# Patient Record
Sex: FEMALE
[De-identification: names, ages, dates, MRNs, and addresses within clinical notes are randomized per-mention and may not be internally consistent; named-entity substitution may affect disease eponyms.]

---

## 2022-01-05 ENCOUNTER — NURSE TRIAGE (OUTPATIENT)
Dept: OTHER | Facility: CLINIC | Age: 67
End: 2022-01-05

## 2022-01-05 NOTE — TELEPHONE ENCOUNTER
Reason for Disposition   General information question, no triage required and triager able to answer question    Protocols used: INFORMATION ONLY CALL - NO TRIAGE-ADULT-[facility]    ERROR

## 2022-01-05 NOTE — TELEPHONE ENCOUNTER
Subjective: Caller states \"I just want to information on low blood sugar. \"     Current Symptoms:None/NA      Associated Symptoms: NA    Pain Severity: 0/10; N/A; none    Temperature: N/A N/A    What has been tried: PT calling for info only. Recommended disposition: Home care. Care advice provided, patient verbalizes understanding; denies any other questions or concerns; instructed to call back for any new or worsening symptoms. PT called in for advice and care advice given. This triage is a result of a call to [address] [place] [address]. Please do not respond to the triage nurse through this encounter. Any subsequent communication should be directly with the patient.       Reason for Disposition   Low blood sugar prevention, questions about    Protocols used: DIABETES - LOW BLOOD SUGAR-ADULT-

## 2022-01-27 NOTE — ADDENDUM NOTE
Addended by: Sherwin Agarwal on: 1/27/2022 12:35 PM     Modules accepted: Level of Service, SmartSet

## 2022-07-24 ENCOUNTER — NURSE TRIAGE (OUTPATIENT)
Dept: OTHER | Facility: CLINIC | Age: 67
End: 2022-07-24

## 2022-07-24 NOTE — TELEPHONE ENCOUNTER
Subjective: Caller states \"I am concerned about Covid\"     Current Symptoms: Caller is concerned about Covid and going to wedding reception with her risk factors. Caller just wants to talk through going to the wedding or not. Onset: NA    Associated Symptoms: NA    Pain Severity: NA    Temperature: NA    What has been tried: NA    LMP: NA Pregnant: NA    Recommended disposition: NA    Care advice provided, patient verbalizes understanding; denies any other questions or concerns; instructed to call back for any new or worsening symptoms. Caller wants to talk through going to nephews wedding    This triage is a result of a call to [facility]. Please do not respond to the triage nurse through this encounter. Any subsequent communication should be directly with the patient. Reason for Disposition   General information question, no triage required and triager able to answer question    Protocols used:  Information Only [facility] - [facility]

## 2022-07-26 ENCOUNTER — NURSE TRIAGE (OUTPATIENT)
Dept: OTHER | Facility: CLINIC | Age: 67
End: 2022-07-26

## 2022-07-26 NOTE — TELEPHONE ENCOUNTER
Subjective: Caller states \"Concerned about Covid \"     Current Symptoms: Has a wedding coming up and concerned about being exposed to Covid. Onset: n/a    Associated Symptoms: NA    Pain Severity: n/a    Temperature: n/a    What has been tried: n/a    LMP: NA Pregnant: NA    Recommended disposition:  No triage, info only    Care advice provided, patient verbalizes understanding; denies any other questions or concerns; instructed to call back for any new or worsening symptoms. Just wanted to discuss traveling with Covid precaution    This triage is a result of a call to [address] [place] [address]. Please do not respond to the triage nurse through this encounter. Any subsequent communication should be directly with the patient. Reason for Disposition   Information only question and nurse able to answer    Protocols used:  Information Only Call - No Triage-ADULT-[place]

## 2022-11-06 ENCOUNTER — NURSE TRIAGE (OUTPATIENT)
Dept: OTHER | Facility: CLINIC | Age: 67
End: 2022-11-06

## 2022-11-06 NOTE — TELEPHONE ENCOUNTER
Location of patient: [place]    Subjective: Caller states \"Covid+\"     Current Symptoms: 10/25 Covid+. Tested negative about 6 days later. Tested Covid+ 11/6, body aches and fatigue. Ox 99%    Onset: a few days ago; gradual    Associated Symptoms: NA    Pain Severity: 5/10; aching; intermittent    Temperature: denies fever    What has been tried: Tylenol    LMP: NA Pregnant: NA    Recommended disposition:  Call PCP now    Care advice provided, patient verbalizes understanding; denies any other questions or concerns; instructed to call back for any new or worsening symptoms. Patient/caller agrees to follow-up with PCP     This triage is a result of a call to [address] [place] [address]. Please do not respond to the triage nurse through this encounter. Any subsequent communication should be directly with the patient.       Reason for Disposition   [1] HIGH RISK for severe COVID complications (e.g., weak immune system, age > 59 years, obesity with BMI > 25, pregnant, chronic lung disease or other chronic medical condition) AND [2] COVID symptoms (e.g., cough, fever)  (Exceptions: Already seen by PCP and no new or worsening symptoms.)    Protocols used: Coronavirus (COVID-19) Diagnosed or Suspected-ADULT-

## 2023-04-07 PROBLEM — E04.1 THYROID NODULE: Status: ACTIVE | Noted: 2023-04-07

## 2023-04-07 PROBLEM — I10 BENIGN ESSENTIAL HYPERTENSION: Status: ACTIVE | Noted: 2023-04-07

## 2023-04-07 PROBLEM — J30.9 ALLERGIC RHINITIS: Status: ACTIVE | Noted: 2023-04-07

## 2023-04-07 PROBLEM — B00.9 HERPES SIMPLEX TYPE 1 INFECTION: Status: ACTIVE | Noted: 2023-04-07

## 2023-04-07 PROBLEM — E03.9 HYPOTHYROIDISM: Status: ACTIVE | Noted: 2023-04-07
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
## 2023-04-07 PROBLEM — U07.1 COVID-19: Status: ACTIVE | Noted: 2023-04-07

## 2023-04-07 PROBLEM — L01.00 IMPETIGO: Status: ACTIVE | Noted: 2023-04-07

## 2023-04-07 PROBLEM — S00.96XA TICK BITE OF HEAD: Status: ACTIVE | Noted: 2023-04-07

## 2023-04-07 PROBLEM — R73.09 OTHER ABNORMAL GLUCOSE: Status: ACTIVE | Noted: 2023-04-07

## 2023-04-07 PROBLEM — F41.9 ANXIETY DISORDER: Status: ACTIVE | Noted: 2023-04-07

## 2023-04-07 PROBLEM — E11.9 TYPE 2 DIABETES MELLITUS (MULTI): Status: ACTIVE | Noted: 2023-04-07

## 2023-04-07 RX ORDER — FLUTICASONE PROPIONATE 50 MCG
2 SPRAY, SUSPENSION (ML) NASAL DAILY PRN
COMMUNITY
Start: 2013-01-22

## 2023-04-07 RX ORDER — METOPROLOL TARTRATE 25 MG/1
1 TABLET, FILM COATED ORAL DAILY
COMMUNITY
Start: 2014-01-29 | End: 2023-07-12 | Stop reason: SDUPTHER

## 2023-04-07 RX ORDER — MUPIROCIN 20 MG/G
OINTMENT TOPICAL 3 TIMES DAILY PRN
COMMUNITY

## 2023-04-07 RX ORDER — METFORMIN HYDROCHLORIDE 500 MG/1
500 TABLET ORAL DAILY
COMMUNITY
Start: 2018-04-13 | End: 2024-01-08

## 2023-04-07 RX ORDER — LEVOTHYROXINE SODIUM 50 UG/1
1 TABLET ORAL DAILY
COMMUNITY
Start: 2018-09-26 | End: 2023-08-28

## 2023-04-07 RX ORDER — VALACYCLOVIR HYDROCHLORIDE 1 G/1
2 TABLET, FILM COATED ORAL 2 TIMES DAILY PRN
COMMUNITY
Start: 2013-01-22

## 2023-04-07 RX ORDER — CLONAZEPAM 1 MG/1
1.5 TABLET ORAL NIGHTLY
COMMUNITY
Start: 2013-07-12

## 2023-04-18 LAB
ALBUMIN (G/DL) IN SER/PLAS: 4.6 G/DL (ref 3.4–5)
ANION GAP IN SER/PLAS: 11 MMOL/L (ref 10–20)
CALCIUM (MG/DL) IN SER/PLAS: 9.9 MG/DL (ref 8.6–10.3)
CARBON DIOXIDE, TOTAL (MMOL/L) IN SER/PLAS: 28 MMOL/L (ref 21–32)
CHLORIDE (MMOL/L) IN SER/PLAS: 100 MMOL/L (ref 98–107)
CREATININE (MG/DL) IN SER/PLAS: 0.8 MG/DL (ref 0.5–1.05)
ESTIMATED AVERAGE GLUCOSE FOR HBA1C: 126 MG/DL
GFR FEMALE: 80 ML/MIN/1.73M2
GLUCOSE (MG/DL) IN SER/PLAS: 104 MG/DL (ref 74–99)
HEMOGLOBIN A1C/HEMOGLOBIN TOTAL IN BLOOD: 6 %
PHOSPHATE (MG/DL) IN SER/PLAS: 3.9 MG/DL (ref 2.5–4.9)
POTASSIUM (MMOL/L) IN SER/PLAS: 3.8 MMOL/L (ref 3.5–5.3)
SODIUM (MMOL/L) IN SER/PLAS: 135 MMOL/L (ref 136–145)
THYROTROPIN (MIU/L) IN SER/PLAS BY DETECTION LIMIT <= 0.05 MIU/L: 2.57 MIU/L (ref 0.44–3.98)
THYROXINE (T4) FREE (NG/DL) IN SER/PLAS: 1.26 NG/DL (ref 0.61–1.12)
UREA NITROGEN (MG/DL) IN SER/PLAS: 10 MG/DL (ref 6–23)

## 2023-06-02 ENCOUNTER — APPOINTMENT (OUTPATIENT)
Dept: PRIMARY CARE | Facility: CLINIC | Age: 68
End: 2023-06-02
Payer: MEDICARE

## 2023-06-16 ENCOUNTER — APPOINTMENT (OUTPATIENT)
Dept: PRIMARY CARE | Facility: CLINIC | Age: 68
End: 2023-06-16
Payer: MEDICARE

## 2023-06-23 ENCOUNTER — APPOINTMENT (OUTPATIENT)
Dept: PRIMARY CARE | Facility: CLINIC | Age: 68
End: 2023-06-23
Payer: MEDICARE

## 2023-06-23 LAB
THYROTROPIN (MIU/L) IN SER/PLAS BY DETECTION LIMIT <= 0.05 MIU/L: 2.37 MIU/L (ref 0.44–3.98)
THYROXINE (T4) FREE (NG/DL) IN SER/PLAS: 0.88 NG/DL (ref 0.61–1.12)
TRIIODOTHYRONINE (T3) FREE (PG/ML) IN SER/PLAS: 2.7 PG/ML (ref 2.3–4.2)

## 2023-07-03 ENCOUNTER — APPOINTMENT (OUTPATIENT)
Dept: PRIMARY CARE | Facility: CLINIC | Age: 68
End: 2023-07-03
Payer: MEDICARE

## 2023-07-12 ENCOUNTER — OFFICE VISIT (OUTPATIENT)
Dept: PRIMARY CARE | Facility: CLINIC | Age: 68
End: 2023-07-12
Payer: MEDICARE

## 2023-07-12 VITALS
BODY MASS INDEX: 19.75 KG/M2 | OXYGEN SATURATION: 97 % | SYSTOLIC BLOOD PRESSURE: 110 MMHG | DIASTOLIC BLOOD PRESSURE: 70 MMHG | HEART RATE: 72 BPM | WEIGHT: 100.6 LBS | HEIGHT: 60 IN | TEMPERATURE: 97.8 F

## 2023-07-12 DIAGNOSIS — Z12.11 COLON CANCER SCREENING: ICD-10-CM

## 2023-07-12 DIAGNOSIS — E03.9 ACQUIRED HYPOTHYROIDISM: ICD-10-CM

## 2023-07-12 DIAGNOSIS — Z00.00 MEDICARE ANNUAL WELLNESS VISIT, SUBSEQUENT: ICD-10-CM

## 2023-07-12 DIAGNOSIS — J30.89 ALLERGIC RHINITIS DUE TO OTHER ALLERGIC TRIGGER, UNSPECIFIED SEASONALITY: ICD-10-CM

## 2023-07-12 DIAGNOSIS — Z78.0 ASYMPTOMATIC MENOPAUSE: ICD-10-CM

## 2023-07-12 DIAGNOSIS — E04.1 THYROID NODULE: ICD-10-CM

## 2023-07-12 DIAGNOSIS — R73.09 OTHER ABNORMAL GLUCOSE: ICD-10-CM

## 2023-07-12 DIAGNOSIS — Z12.31 OTHER SCREENING MAMMOGRAM: Primary | ICD-10-CM

## 2023-07-12 DIAGNOSIS — I10 BENIGN ESSENTIAL HYPERTENSION: ICD-10-CM

## 2023-07-12 DIAGNOSIS — I10 PRIMARY HYPERTENSION: ICD-10-CM

## 2023-07-12 PROBLEM — S00.96XA TICK BITE OF HEAD: Status: RESOLVED | Noted: 2023-04-07 | Resolved: 2023-07-12
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
## 2023-07-12 PROBLEM — L01.00 IMPETIGO: Status: RESOLVED | Noted: 2023-04-07 | Resolved: 2023-07-12

## 2023-07-12 PROCEDURE — 1125F AMNT PAIN NOTED PAIN PRSNT: CPT | Performed by: INTERNAL MEDICINE

## 2023-07-12 PROCEDURE — 1159F MED LIST DOCD IN RCRD: CPT | Performed by: INTERNAL MEDICINE

## 2023-07-12 PROCEDURE — 3044F HG A1C LEVEL LT 7.0%: CPT | Performed by: INTERNAL MEDICINE

## 2023-07-12 PROCEDURE — 3078F DIAST BP <80 MM HG: CPT | Performed by: INTERNAL MEDICINE

## 2023-07-12 PROCEDURE — 99213 OFFICE O/P EST LOW 20 MIN: CPT | Performed by: INTERNAL MEDICINE

## 2023-07-12 PROCEDURE — G0439 PPPS, SUBSEQ VISIT: HCPCS | Performed by: INTERNAL MEDICINE

## 2023-07-12 PROCEDURE — 3074F SYST BP LT 130 MM HG: CPT | Performed by: INTERNAL MEDICINE

## 2023-07-12 PROCEDURE — 1160F RVW MEDS BY RX/DR IN RCRD: CPT | Performed by: INTERNAL MEDICINE

## 2023-07-12 PROCEDURE — 1036F TOBACCO NON-USER: CPT | Performed by: INTERNAL MEDICINE

## 2023-07-12 PROCEDURE — G0444 DEPRESSION SCREEN ANNUAL: HCPCS | Performed by: INTERNAL MEDICINE

## 2023-07-12 PROCEDURE — G0442 ANNUAL ALCOHOL SCREEN 15 MIN: HCPCS | Performed by: INTERNAL MEDICINE

## 2023-07-12 PROCEDURE — 1170F FXNL STATUS ASSESSED: CPT | Performed by: INTERNAL MEDICINE

## 2023-07-12 RX ORDER — METOPROLOL TARTRATE 25 MG/1
25 TABLET, FILM COATED ORAL DAILY
Qty: 90 TABLET | Refills: 3 | Status: SHIPPED | OUTPATIENT
Start: 2023-07-12

## 2023-07-12 RX ORDER — BIMATOPROST 0.1 MG/ML
1 SOLUTION/ DROPS OPHTHALMIC NIGHTLY
COMMUNITY

## 2023-07-12 ASSESSMENT — PATIENT HEALTH QUESTIONNAIRE - PHQ9
1. LITTLE INTEREST OR PLEASURE IN DOING THINGS: NOT AT ALL
SUM OF ALL RESPONSES TO PHQ9 QUESTIONS 1 AND 2: 0
2. FEELING DOWN, DEPRESSED OR HOPELESS: NOT AT ALL

## 2023-07-12 ASSESSMENT — ACTIVITIES OF DAILY LIVING (ADL)
DRESSING: INDEPENDENT
BATHING: INDEPENDENT

## 2023-07-12 NOTE — PROGRESS NOTES
Subjective   Patient ID: Janneth Celis is a 68 y.o. female who presents for Medicare Annual Wellness Visit Subsequent and Discuss tests for diabetes and thyroid .    Here for MCR and follow up on HTN,DM-2,hypothyroid and thyroid nodule.  Eye exam up to date with Dr zheng.no retinopathy.  She sees endo Dr Herrera now in [place].  Home BP at goal,average 110/70.she has white coat syndrome.  She has allergies symptoms,with PND,itchy eyes,sinus ,scratchy sore,home covid test today negative at home,she tried Flonase and Astepro without relief.  Pt never gain weight despite eating enough(run in the family).         Review of Systems   Reason unable to perform ROS: as HPI.       Objective   /70 Comment (BP Location): home BP average.  Pulse 72   Temp 36.6 °C (97.8 °F) (Temporal)   Ht 1.524 m (5')   Wt 45.6 kg (100 lb 9.6 oz)   SpO2 97%   BMI 19.65 kg/m²     Physical Exam  Constitutional:       General: She is not in acute distress.     Appearance: Normal appearance. She is not diaphoretic.   HENT:      Head: Normocephalic and atraumatic.      Right Ear: Tympanic membrane, ear canal and external ear normal.      Left Ear: Tympanic membrane, ear canal and external ear normal.      Nose: Congestion and rhinorrhea present.      Mouth/Throat:      Pharynx: Posterior oropharyngeal erythema present. No oropharyngeal exudate.   Eyes:      General: No scleral icterus.        Right eye: No discharge.         Left eye: No discharge.      Extraocular Movements: Extraocular movements intact.      Pupils: Pupils are equal, round, and reactive to light.   Cardiovascular:      Rate and Rhythm: Normal rate and regular rhythm.      Heart sounds: Normal heart sounds.   Pulmonary:      Effort: Pulmonary effort is normal.      Breath sounds: Normal breath sounds. No wheezing or rhonchi.   Abdominal:      General: Abdomen is flat. Bowel sounds are normal. There is no distension.      Palpations: Abdomen is soft.      Tenderness:  There is no abdominal tenderness.   Musculoskeletal:         General: Normal range of motion.      Cervical back: Normal range of motion and neck supple.      Left lower leg: No edema.   Skin:     General: Skin is warm.   Neurological:      General: No focal deficit present.      Mental Status: She is alert and oriented to person, place, and time.   Psychiatric:         Mood and Affect: Mood normal.         Behavior: Behavior normal.         Assessment/Plan   Problem List Items Addressed This Visit       Benign essential hypertension     Stable on current Metoprolol.  Pt has white coat syndrome,see copy of home BP log brought in by pt today.         Other abnormal glucose     Stable on Metformin,managed by endo.         Hypothyroidism     Stable on levothyroxine.  Pt seen by Dr Herrera,endo.         Thyroid nodule     Monitored by endo.         Medicare annual wellness visit, subsequent     Order mamogram,DEXA and Cologuard.  Advised shingrix and Tdap.  Pt declined any immunizations for now.         Other screening mammogram - Primary    Relevant Orders    BI mammo bilateral screening tomosynthesis    Asymptomatic menopause    Relevant Orders    XR DEXA bone density    Colon cancer screening    Relevant Orders    Cologuard® colon cancer screening     Other Visit Diagnoses       Primary hypertension        Relevant Medications    metoprolol tartrate (Lopressor) 25 mg tablet

## 2023-07-13 PROBLEM — Z00.00 MEDICARE ANNUAL WELLNESS VISIT, SUBSEQUENT: Status: ACTIVE | Noted: 2023-07-13

## 2023-07-13 PROBLEM — Z12.11 COLON CANCER SCREENING: Status: ACTIVE | Noted: 2023-07-13

## 2023-07-13 PROBLEM — Z78.0 ASYMPTOMATIC MENOPAUSE: Status: ACTIVE | Noted: 2023-07-13

## 2023-07-13 PROBLEM — Z12.31 OTHER SCREENING MAMMOGRAM: Status: ACTIVE | Noted: 2023-07-13

## 2023-07-13 NOTE — ASSESSMENT & PLAN NOTE
Stable on current Metoprolol.  Pt has white coat syndrome,see copy of home BP log brought in by pt today.

## 2023-07-13 NOTE — ASSESSMENT & PLAN NOTE
Order mamogram,DEXA and Cologuard.  Advised shingrix and Tdap.  Pt declined any immunizations for now.

## 2023-07-13 NOTE — ASSESSMENT & PLAN NOTE
Add zyrtec.  Do another covid,call if not better.  Keep appt with all specialist.  Return in 1 year and as needed.

## 2023-08-28 ENCOUNTER — TELEMEDICINE (OUTPATIENT)
Dept: PRIMARY CARE | Facility: CLINIC | Age: 68
End: 2023-08-28
Payer: MEDICARE

## 2023-08-28 VITALS
HEART RATE: 75 BPM | SYSTOLIC BLOOD PRESSURE: 117 MMHG | DIASTOLIC BLOOD PRESSURE: 77 MMHG | TEMPERATURE: 97.5 F | OXYGEN SATURATION: 98 %

## 2023-08-28 DIAGNOSIS — J01.10 ACUTE NON-RECURRENT FRONTAL SINUSITIS: Primary | ICD-10-CM

## 2023-08-28 PROBLEM — M79.7 FIBROMYALGIA: Status: ACTIVE | Noted: 2023-08-28

## 2023-08-28 PROBLEM — K58.9 IRRITABLE BOWEL SYNDROME: Status: ACTIVE | Noted: 2023-08-28

## 2023-08-28 PROBLEM — F40.01 AGORAPHOBIA WITH PANIC DISORDER: Status: ACTIVE | Noted: 2023-08-28

## 2023-08-28 PROBLEM — M20.11 VALGUS DEFORMITY OF BOTH GREAT TOES: Status: ACTIVE | Noted: 2019-10-23

## 2023-08-28 PROBLEM — N20.0 CALCULUS OF KIDNEY: Status: ACTIVE | Noted: 2023-08-28

## 2023-08-28 PROBLEM — I10 ESSENTIAL HYPERTENSION, BENIGN: Status: ACTIVE | Noted: 2023-08-28

## 2023-08-28 PROBLEM — E11.9 CONTROLLED TYPE 2 DIABETES MELLITUS WITHOUT COMPLICATION, WITHOUT LONG-TERM CURRENT USE OF INSULIN (MULTI): Status: ACTIVE | Noted: 2019-10-23

## 2023-08-28 PROBLEM — O14.90 PREECLAMPSIA (HHS-HCC): Status: ACTIVE | Noted: 2023-08-28

## 2023-08-28 PROBLEM — M26.609 TMJ (TEMPOROMANDIBULAR JOINT DISORDER): Status: ACTIVE | Noted: 2023-08-28

## 2023-08-28 PROBLEM — K44.9 HIATAL HERNIA: Status: ACTIVE | Noted: 2023-08-28

## 2023-08-28 PROBLEM — M20.12 VALGUS DEFORMITY OF BOTH GREAT TOES: Status: ACTIVE | Noted: 2019-10-23

## 2023-08-28 PROCEDURE — 99213 OFFICE O/P EST LOW 20 MIN: CPT | Performed by: INTERNAL MEDICINE

## 2023-08-28 RX ORDER — LEVOTHYROXINE SODIUM 25 UG/1
25 TABLET ORAL
COMMUNITY

## 2023-08-28 RX ORDER — DOXYCYCLINE 100 MG/1
100 CAPSULE ORAL 2 TIMES DAILY
Qty: 20 CAPSULE | Refills: 0 | Status: SHIPPED | OUTPATIENT
Start: 2023-08-28

## 2023-08-28 ASSESSMENT — ENCOUNTER SYMPTOMS
NAUSEA: 0
FACIAL SWELLING: 0
SHORTNESS OF BREATH: 0
FEVER: 0
SINUS PAIN: 1
SORE THROAT: 1
DIARRHEA: 0
SINUS PRESSURE: 1
FATIGUE: 1
COUGH: 0

## 2023-08-28 ASSESSMENT — PATIENT HEALTH QUESTIONNAIRE - PHQ9
1. LITTLE INTEREST OR PLEASURE IN DOING THINGS: NOT AT ALL
2. FEELING DOWN, DEPRESSED OR HOPELESS: NOT AT ALL
SUM OF ALL RESPONSES TO PHQ9 QUESTIONS 1 AND 2: 0

## 2023-08-28 NOTE — PROGRESS NOTES
Subjective   Patient ID: Janneth Celis is a 68 y.o. female who presents for Follow up from [facility], Nasal Congestion, Sore throat on right side , Strep negative , COVID negative , Fatigue, Puffy eyes , Headache, and Facial Pain.    An interactive audio and video telecommunication system with patient real time communications between the patient (at the original site) and provider (at the distant site) was utilized to provide this telehealth service.  Verbal consent was requested and obtained from the patient at this date for a telehealth.  Pt had sore throat last week,seen at ,her covid PCR was negative,she had 3 negative home covid test,she now has bilateral facial pressure,post nasal drainage,no fever,chills.  She has been using Mucinex,Flonase,Hot pack,steamy showers without relief.         Review of Systems   Constitutional:  Positive for fatigue. Negative for fever.   HENT:  Positive for sinus pressure, sinus pain and sore throat. Negative for ear pain and facial swelling.         Sinus pressure over bi frontal and maxillary area.   Respiratory:  Negative for cough and shortness of breath.    Gastrointestinal:  Negative for diarrhea and nausea.       Objective   /77   Pulse 75   Temp 36.4 °C (97.5 °F) (Oral)   SpO2 98%     Physical Exam  Constitutional:       General: She is not in acute distress.     Appearance: She is not ill-appearing.   Pulmonary:      Effort: No respiratory distress.   Neurological:      Mental Status: She is alert.         Assessment/Plan

## 2023-08-28 NOTE — ASSESSMENT & PLAN NOTE
Will treat with Doxycycline.  Continue Flonase,Mucinex.  Add probiotic while on ATB.  Call in 4 days if not better.

## 2023-08-29 LAB — SARS-COV-2 RESULT: NOT DETECTED

## 2023-09-09 ENCOUNTER — TELEMEDICINE (OUTPATIENT)
Dept: PRIMARY CARE | Facility: CLINIC | Age: 68
End: 2023-09-09
Payer: MEDICARE

## 2023-09-09 DIAGNOSIS — B37.0 ORAL THRUSH: Primary | ICD-10-CM

## 2023-09-09 PROCEDURE — 99213 OFFICE O/P EST LOW 20 MIN: CPT | Performed by: FAMILY MEDICINE

## 2023-09-09 RX ORDER — NYSTATIN 100000 [USP'U]/ML
4 SUSPENSION ORAL 4 TIMES DAILY
Qty: 60 ML | Refills: 0 | Status: SHIPPED | OUTPATIENT
Start: 2023-09-09 | End: 2023-09-13

## 2023-09-09 NOTE — PATIENT INSTRUCTIONS
Please take your medications as prescribed  please call your primary care doctor your symptoms fail to improve or worsen

## 2023-09-09 NOTE — PROGRESS NOTES
Subjective   Patient ID: Janneth Celis is a 68 y.o. female who presents for whitish coating on tongue    HPI  Virtual Consent    An interactive audio and video telecommunication system which permits real time communications between the patient (at the originating site) and provider (at the distant site) was utilized to provide this telehealth service.   Verbal consent was requested and obtained from Janneth Celis on this date, 09/09/23 for a telehealth visit.     Was on doxycycline recently since finishing it, has noticed that her tongue has a whitish coating and feels irritated and painful. Has not tried to brush off the coating. No fever. No sore throat.    Review of Systems  General: no fever  Eyes: no blurry vision  ENT: no sore throat, no ear pain  Resp: no cough, sob or wheezing  Cardio: no chest pain, no palpitations  Abd: no nausea/vomiting  : no dysuria, no increased urinary frequency      Vitals:   due to telehealth visit, vitals not obtained, patient did not have them available at the time of the visit       Objective   Physical Exam  Physical exam done virtually through the computer screen     Gen: NAD  eyes: conjunctivae normal   Nose: external nose normal   Resp: does not appear to be short of breath at present time, no audible wheezing  Oropharynx: whitish coating on tongue    Assessment/Plan   Problem List Items Addressed This Visit    None  Visit Diagnoses       Oral thrush    -  Primary    Relevant Medications    nystatin (Mycostatin) 100,000 unit/mL suspension        Advised patient to try to brush the whitish coating of tongue, if comes off then does not have to start medication, if it doesn't then should start the thrush medication

## 2023-10-11 DIAGNOSIS — E03.9 ACQUIRED HYPOTHYROIDISM: Primary | ICD-10-CM

## 2023-10-11 RX ORDER — LEVOTHYROXINE SODIUM 50 UG/1
50 TABLET ORAL DAILY
Qty: 90 TABLET | Refills: 3 | Status: SHIPPED | OUTPATIENT
Start: 2023-10-11

## 2024-01-07 DIAGNOSIS — E11.9 TYPE 2 DIABETES MELLITUS WITHOUT COMPLICATIONS (MULTI): ICD-10-CM

## 2024-01-08 RX ORDER — METFORMIN HYDROCHLORIDE 500 MG/1
1000 TABLET ORAL DAILY
Qty: 180 TABLET | Refills: 2 | Status: SHIPPED | OUTPATIENT
Start: 2024-01-08

## 2024-05-07 ENCOUNTER — LAB (OUTPATIENT)
Dept: LAB | Facility: LAB | Age: 69
End: 2024-05-07
Payer: MEDICARE

## 2024-05-07 DIAGNOSIS — E03.9 HYPOTHYROIDISM, UNSPECIFIED: ICD-10-CM

## 2024-05-07 DIAGNOSIS — E11.9 TYPE 2 DIABETES MELLITUS WITHOUT COMPLICATIONS (MULTI): Primary | ICD-10-CM

## 2024-05-07 LAB
EST. AVERAGE GLUCOSE BLD GHB EST-MCNC: 126 MG/DL
HBA1C MFR BLD: 6 %
T3FREE SERPL-MCNC: 2.8 PG/ML (ref 2.3–4.2)
T4 FREE SERPL-MCNC: 1.16 NG/DL (ref 0.61–1.12)
TSH SERPL-ACNC: 2.8 MIU/L (ref 0.44–3.98)

## 2024-05-07 PROCEDURE — 84481 FREE ASSAY (FT-3): CPT

## 2024-05-07 PROCEDURE — 84443 ASSAY THYROID STIM HORMONE: CPT

## 2024-05-07 PROCEDURE — 36415 COLL VENOUS BLD VENIPUNCTURE: CPT

## 2024-05-07 PROCEDURE — 84439 ASSAY OF FREE THYROXINE: CPT

## 2024-05-07 PROCEDURE — 83036 HEMOGLOBIN GLYCOSYLATED A1C: CPT

## 2024-05-09 ENCOUNTER — LAB (OUTPATIENT)
Dept: LAB | Facility: LAB | Age: 69
End: 2024-05-09
Payer: MEDICARE

## 2024-05-09 DIAGNOSIS — E11.9 TYPE 2 DIABETES MELLITUS WITHOUT COMPLICATIONS (MULTI): Primary | ICD-10-CM

## 2024-05-09 LAB
CHOLEST SERPL-MCNC: 139 MG/DL (ref 0–199)
CHOLESTEROL/HDL RATIO: 1.8
CREAT UR-MCNC: 70 MG/DL (ref 20–320)
HDLC SERPL-MCNC: 76.3 MG/DL
LDLC SERPL CALC-MCNC: 53 MG/DL
MICROALBUMIN UR-MCNC: <7 MG/L
MICROALBUMIN/CREAT UR: NORMAL MG/G{CREAT}
NON HDL CHOLESTEROL: 63 MG/DL (ref 0–149)
TRIGL SERPL-MCNC: 49 MG/DL (ref 0–149)
VLDL: 10 MG/DL (ref 0–40)

## 2024-05-09 PROCEDURE — 82043 UR ALBUMIN QUANTITATIVE: CPT

## 2024-05-09 PROCEDURE — 36415 COLL VENOUS BLD VENIPUNCTURE: CPT

## 2024-05-09 PROCEDURE — 82570 ASSAY OF URINE CREATININE: CPT

## 2024-05-09 PROCEDURE — 80061 LIPID PANEL: CPT

## 2024-05-24 ENCOUNTER — TELEPHONE (OUTPATIENT)
Dept: PRIMARY CARE | Facility: CLINIC | Age: 69
End: 2024-05-24
Payer: MEDICARE

## 2024-05-24 NOTE — TELEPHONE ENCOUNTER
Fyi patient has swollen lymph nodes and has some pain in upper ribcage near breasts is out of town helping her daughter who just had surgery. Offered appointment with [facility] June 5. Dr. Ospina has opening fri 5/31 but patient advised she would need breast exam

## 2024-05-24 NOTE — TELEPHONE ENCOUNTER
ES  lm for patient to call office for sooner   appointment can add 6/3 ok per sb         Note             Please squeeze in for an afternoon when patient is in town.      MD Ariana Eastman MA22 minutes ago (1:07 PM)       Needs appt,ok to add this afternoon to my schedule for 15 minutes,if in town.     YUDITH Howe MD1 hour ago (12:03 PM)       Please advise.  I tried to call patient to see when she will be back in town. I'm not sure if you wanted to call something in to the pharmacy or what patient should do for this.     You routed conversation to Ariana Layne MA2 hours ago (11:03 AM)     You2 hours ago (11:03 AM)     TRICIA Johnson patient has swollen lymph nodes and has some pain in upper ribcage near breasts is out of town helping her daughter who just had surgery. Offered appointment with [facility] June 5. Dr. Ospina has opening fri 5/31 but patient advised she would need breast exam         Note        Janneth Celis [phone]  You2 hours ago (11:00 AM)

## 2024-05-30 ENCOUNTER — OFFICE VISIT (OUTPATIENT)
Dept: PRIMARY CARE | Facility: CLINIC | Age: 69
End: 2024-05-30
Payer: MEDICARE

## 2024-05-30 VITALS
DIASTOLIC BLOOD PRESSURE: 83 MMHG | HEART RATE: 75 BPM | TEMPERATURE: 97.3 F | SYSTOLIC BLOOD PRESSURE: 123 MMHG | OXYGEN SATURATION: 98 % | WEIGHT: 101.2 LBS | HEIGHT: 60 IN | BODY MASS INDEX: 19.87 KG/M2

## 2024-05-30 DIAGNOSIS — J06.9 UPPER RESPIRATORY TRACT INFECTION, UNSPECIFIED TYPE: ICD-10-CM

## 2024-05-30 DIAGNOSIS — Z12.31 ENCOUNTER FOR SCREENING MAMMOGRAM FOR MALIGNANT NEOPLASM OF BREAST: Primary | ICD-10-CM

## 2024-05-30 DIAGNOSIS — M94.0 COSTOCHONDRITIS: ICD-10-CM

## 2024-05-30 DIAGNOSIS — E11.9 CONTROLLED TYPE 2 DIABETES MELLITUS WITHOUT COMPLICATION, WITHOUT LONG-TERM CURRENT USE OF INSULIN (MULTI): ICD-10-CM

## 2024-05-30 PROBLEM — M75.00 ADHESIVE CAPSULITIS OF SHOULDER: Status: ACTIVE | Noted: 2018-03-27

## 2024-05-30 PROBLEM — E11.65 POORLY CONTROLLED DIABETES MELLITUS (MULTI): Status: ACTIVE | Noted: 2018-04-23

## 2024-05-30 PROBLEM — I15.9 SECONDARY HYPERTENSION: Status: ACTIVE | Noted: 2024-05-30

## 2024-05-30 PROBLEM — Z86.79 HISTORY OF HYPERTENSION: Status: ACTIVE | Noted: 2024-05-30

## 2024-05-30 PROBLEM — E11.65 POORLY CONTROLLED DIABETES MELLITUS (MULTI): Status: RESOLVED | Noted: 2018-04-23 | Resolved: 2024-05-30

## 2024-05-30 PROBLEM — Z86.19 HISTORY OF VIRAL INFECTION: Status: ACTIVE | Noted: 2024-05-30

## 2024-05-30 PROBLEM — Z86.69 HISTORY OF GLAUCOMA: Status: ACTIVE | Noted: 2024-05-30

## 2024-05-30 PROBLEM — Z86.32 HISTORY OF GESTATIONAL DIABETES MELLITUS (GDM): Status: ACTIVE | Noted: 2023-08-28

## 2024-05-30 PROBLEM — S91.309A OPEN WOUND OF FOOT: Status: ACTIVE | Noted: 2024-05-30

## 2024-05-30 PROBLEM — Z86.39 HISTORY OF HYPOTHYROIDISM: Status: ACTIVE | Noted: 2024-05-30

## 2024-05-30 PROBLEM — Z86.2 HISTORY OF ANEMIA: Status: ACTIVE | Noted: 2024-05-30

## 2024-05-30 PROBLEM — Z20.822 CONTACT WITH AND (SUSPECTED) EXPOSURE TO COVID-19: Status: ACTIVE | Noted: 2024-05-30

## 2024-05-30 PROBLEM — Z86.69 HISTORY OF HEARING PROBLEM: Status: ACTIVE | Noted: 2024-05-30

## 2024-05-30 PROBLEM — B37.0 CANDIDIASIS OF MOUTH: Status: ACTIVE | Noted: 2024-05-30

## 2024-05-30 PROBLEM — R00.2 PALPITATIONS: Status: ACTIVE | Noted: 2024-05-30

## 2024-05-30 PROCEDURE — 3079F DIAST BP 80-89 MM HG: CPT | Performed by: INTERNAL MEDICINE

## 2024-05-30 PROCEDURE — 1036F TOBACCO NON-USER: CPT | Performed by: INTERNAL MEDICINE

## 2024-05-30 PROCEDURE — 3044F HG A1C LEVEL LT 7.0%: CPT | Performed by: INTERNAL MEDICINE

## 2024-05-30 PROCEDURE — 3062F POS MACROALBUMINURIA REV: CPT | Performed by: INTERNAL MEDICINE

## 2024-05-30 PROCEDURE — 3048F LDL-C <100 MG/DL: CPT | Performed by: INTERNAL MEDICINE

## 2024-05-30 PROCEDURE — 3074F SYST BP LT 130 MM HG: CPT | Performed by: INTERNAL MEDICINE

## 2024-05-30 PROCEDURE — 99214 OFFICE O/P EST MOD 30 MIN: CPT | Performed by: INTERNAL MEDICINE

## 2024-05-30 PROCEDURE — 1157F ADVNC CARE PLAN IN RCRD: CPT | Performed by: INTERNAL MEDICINE

## 2024-05-30 RX ORDER — MELOXICAM 15 MG/1
15 TABLET ORAL DAILY
Qty: 30 TABLET | Refills: 0 | Status: SHIPPED | OUTPATIENT
Start: 2024-05-30 | End: 2024-06-29

## 2024-05-30 ASSESSMENT — PATIENT HEALTH QUESTIONNAIRE - PHQ9
10. IF YOU CHECKED OFF ANY PROBLEMS, HOW DIFFICULT HAVE THESE PROBLEMS MADE IT FOR YOU TO DO YOUR WORK, TAKE CARE OF THINGS AT HOME, OR GET ALONG WITH OTHER PEOPLE: SOMEWHAT DIFFICULT
2. FEELING DOWN, DEPRESSED OR HOPELESS: SEVERAL DAYS
SUM OF ALL RESPONSES TO PHQ9 QUESTIONS 1 AND 2: 2
1. LITTLE INTEREST OR PLEASURE IN DOING THINGS: SEVERAL DAYS

## 2024-05-30 ASSESSMENT — ENCOUNTER SYMPTOMS
CONSTITUTIONAL NEGATIVE: 1
RESPIRATORY NEGATIVE: 1
MUSCULOSKELETAL NEGATIVE: 1
CARDIOVASCULAR NEGATIVE: 1
GASTROINTESTINAL NEGATIVE: 1

## 2024-05-30 NOTE — PROGRESS NOTES
"Subjective   Patient ID: Janneth Celis is a 68 y.o. female who presents for Edema.  2 weeks ago noticed \"inflammation in chest area\" - maybe lymph nodes - both sides of the sternum  No breast masses  Has not had a mammogram in years  Swelling is improving  No recent vaccination  Last had Covid (disease) Jose Luis  Has been very stressed  Has chest tightness related to the stress - uses heat and that helped    Has had sore throat; no sinus congestion.  Cough.  It started the weekend.  She recently flew back [place].  She did a COVID test today        Review of Systems   Constitutional: Negative.    HENT: Negative.     Respiratory: Negative.     Cardiovascular: Negative.    Gastrointestinal: Negative.    Genitourinary: Negative.    Musculoskeletal: Negative.        Objective   Vitals:    05/30/24 1402   BP: 123/83   Pulse: 75   Temp: 36.3 °C (97.3 °F)   SpO2: 98%     Physical Exam  HENT:      Head: Normocephalic.      Right Ear: Tympanic membrane normal.      Left Ear: Tympanic membrane normal.      Nose: Nose normal.      Mouth/Throat:      Mouth: Mucous membranes are moist.      Pharynx: No oropharyngeal exudate or posterior oropharyngeal erythema.   Pulmonary:      Effort: Pulmonary effort is normal.      Breath sounds: Normal breath sounds.   Neurological:      Mental Status: She is alert.       Chest wall tenderness to palpation adjacent to the sternum and the fourth and fifth costal spaces  Breast- bilateral symmetric nipple with no masses    Assessment/Plan   Problem List Items Addressed This Visit       Controlled type 2 diabetes mellitus without complication, without long-term current use of insulin (Multi)    Upper respiratory tract infection     Other Visit Diagnoses       Encounter for screening mammogram for malignant neoplasm of breast    -  Primary    Relevant Orders    BI mammo bilateral screening tomosynthesis    Costochondritis        Recommend heating pad 20 minutes on.   she should take the meloxicam for " 3 weeks.  If she is no better at that point she should Dr. Garduno    Relevant Medications    meloxicam (Mobic) 15 mg tablet        Encouraged for her to get her mammogram done.      Follow-up with Dr. Fishman in August as scheduled if the chest wall pain does not improve in 2 to 3 weeks she should follow-up with

## 2024-06-03 ENCOUNTER — APPOINTMENT (OUTPATIENT)
Dept: PRIMARY CARE | Facility: CLINIC | Age: 69
End: 2024-06-03
Payer: MEDICARE

## 2024-06-04 ENCOUNTER — TELEPHONE (OUTPATIENT)
Dept: PRIMARY CARE | Facility: CLINIC | Age: 69
End: 2024-06-04
Payer: MEDICARE

## 2024-06-04 NOTE — TELEPHONE ENCOUNTER
Pt scheduled an appt for Monday 6/10/24 at 8:30 however [facility] did advise her to follow up on two days. Is there any time pt can be added to Friday's schedule. She was there for heart pain. She did advise her electrolytes were off and would not let her leave until her cardiac enzymes came down. She was there most of the day. Please advise

## 2024-06-05 ENCOUNTER — OFFICE VISIT (OUTPATIENT)
Dept: PRIMARY CARE | Facility: CLINIC | Age: 69
End: 2024-06-05
Payer: MEDICARE

## 2024-06-05 VITALS — BODY MASS INDEX: 19.29 KG/M2 | WEIGHT: 98.77 LBS

## 2024-06-05 DIAGNOSIS — Z78.0 ASYMPTOMATIC MENOPAUSE: ICD-10-CM

## 2024-06-05 DIAGNOSIS — J02.0 STREP PHARYNGITIS: ICD-10-CM

## 2024-06-05 DIAGNOSIS — E87.1 HYPONATREMIA: ICD-10-CM

## 2024-06-05 DIAGNOSIS — Z12.31 OTHER SCREENING MAMMOGRAM: ICD-10-CM

## 2024-06-05 DIAGNOSIS — E11.9 CONTROLLED TYPE 2 DIABETES MELLITUS WITHOUT COMPLICATION, WITHOUT LONG-TERM CURRENT USE OF INSULIN (MULTI): ICD-10-CM

## 2024-06-05 DIAGNOSIS — I10 BENIGN ESSENTIAL HYPERTENSION: ICD-10-CM

## 2024-06-05 DIAGNOSIS — F41.9 ANXIETY DISORDER, UNSPECIFIED TYPE: ICD-10-CM

## 2024-06-05 DIAGNOSIS — F33.9 DEPRESSION, RECURRENT (CMS-HCC): ICD-10-CM

## 2024-06-05 DIAGNOSIS — R07.9 CHEST PAIN, UNSPECIFIED TYPE: Primary | ICD-10-CM

## 2024-06-05 DIAGNOSIS — J02.9 SORE THROAT: ICD-10-CM

## 2024-06-05 LAB — POC RAPID STREP: POSITIVE

## 2024-06-05 PROCEDURE — 87880 STREP A ASSAY W/OPTIC: CPT | Performed by: INTERNAL MEDICINE

## 2024-06-05 PROCEDURE — 3062F POS MACROALBUMINURIA REV: CPT | Performed by: INTERNAL MEDICINE

## 2024-06-05 PROCEDURE — 1160F RVW MEDS BY RX/DR IN RCRD: CPT | Performed by: INTERNAL MEDICINE

## 2024-06-05 PROCEDURE — 3044F HG A1C LEVEL LT 7.0%: CPT | Performed by: INTERNAL MEDICINE

## 2024-06-05 PROCEDURE — 1159F MED LIST DOCD IN RCRD: CPT | Performed by: INTERNAL MEDICINE

## 2024-06-05 PROCEDURE — 1157F ADVNC CARE PLAN IN RCRD: CPT | Performed by: INTERNAL MEDICINE

## 2024-06-05 PROCEDURE — 3048F LDL-C <100 MG/DL: CPT | Performed by: INTERNAL MEDICINE

## 2024-06-05 PROCEDURE — 99214 OFFICE O/P EST MOD 30 MIN: CPT | Performed by: INTERNAL MEDICINE

## 2024-06-05 RX ORDER — AZITHROMYCIN 250 MG/1
TABLET, FILM COATED ORAL DAILY
Qty: 6 TABLET | Refills: 0 | Status: SHIPPED | OUTPATIENT
Start: 2024-06-05 | End: 2024-06-10

## 2024-06-05 RX ORDER — PANTOPRAZOLE SODIUM 20 MG/1
20 TABLET, DELAYED RELEASE ORAL
COMMUNITY
Start: 2024-06-03

## 2024-06-05 RX ORDER — SERTRALINE HYDROCHLORIDE 25 MG/1
25 TABLET, FILM COATED ORAL DAILY
COMMUNITY
Start: 2024-04-19

## 2024-06-05 ASSESSMENT — PATIENT HEALTH QUESTIONNAIRE - PHQ9
SUM OF ALL RESPONSES TO PHQ QUESTIONS 1-9: 17
6. FEELING BAD ABOUT YOURSELF - OR THAT YOU ARE A FAILURE OR HAVE LET YOURSELF OR YOUR FAMILY DOWN: NEARLY EVERY DAY
7. TROUBLE CONCENTRATING ON THINGS, SUCH AS READING THE NEWSPAPER OR WATCHING TELEVISION: NOT AT ALL
10. IF YOU CHECKED OFF ANY PROBLEMS, HOW DIFFICULT HAVE THESE PROBLEMS MADE IT FOR YOU TO DO YOUR WORK, TAKE CARE OF THINGS AT HOME, OR GET ALONG WITH OTHER PEOPLE: SOMEWHAT DIFFICULT
2. FEELING DOWN, DEPRESSED OR HOPELESS: NEARLY EVERY DAY
9. THOUGHTS THAT YOU WOULD BE BETTER OFF DEAD, OR OF HURTING YOURSELF: NOT AT ALL
3. TROUBLE FALLING OR STAYING ASLEEP OR SLEEPING TOO MUCH: NEARLY EVERY DAY
4. FEELING TIRED OR HAVING LITTLE ENERGY: NEARLY EVERY DAY
SUM OF ALL RESPONSES TO PHQ9 QUESTIONS 1 AND 2: 6
8. MOVING OR SPEAKING SO SLOWLY THAT OTHER PEOPLE COULD HAVE NOTICED. OR THE OPPOSITE, BEING SO FIGETY OR RESTLESS THAT YOU HAVE BEEN MOVING AROUND A LOT MORE THAN USUAL: SEVERAL DAYS
5. POOR APPETITE OR OVEREATING: SEVERAL DAYS
1. LITTLE INTEREST OR PLEASURE IN DOING THINGS: NEARLY EVERY DAY

## 2024-06-05 NOTE — PROGRESS NOTES
Subjective   Patient ID: Janneth Celis is a 68 y.o. female who presents for Follow-up (Patient is here for an ED follow up from [facility] for musculoskeletal chest pain. ).    Patient is here to follow-up from recent visit to my office while I was gone and from the [place] emergency room, apparently she returned from visiting her family in [place] and was sick with cough and sore throat she then developed chest pain seen by Dr. Balbuena, thought to have costochondritis she was advised to use heating pad and take NSAIDs but then she developed some abdominal pain and due to persistent chest pain she went to [place] emergency room and did rule out for ACS, she has an appointment to see Dr. Morel cardiologist at [facility] soon.  She told her that her best friend  recently, patient has been having some panic attack she will be seeing her psychiatrist.  She denies any shortness of breath nausea or vomiting.  She continues to have sore throat.  Her COVID test was negative.         Review of Systems   HENT:  Positive for sore throat.    Respiratory:          As HPI   Cardiovascular:         As HPI   Psychiatric/Behavioral:  The patient is nervous/anxious.        Objective   Wt (!) 44.8 kg (98 lb 12.3 oz)   BMI 19.29 kg/m²     Physical Exam  Vitals reviewed.   Constitutional:       General: She is not in acute distress.     Appearance: Normal appearance. She is not ill-appearing.   HENT:      Head: Normocephalic and atraumatic.      Right Ear: Tympanic membrane, ear canal and external ear normal.      Left Ear: Tympanic membrane, ear canal and external ear normal.      Nose: Nose normal.      Mouth/Throat:      Mouth: Mucous membranes are moist.      Pharynx: Posterior oropharyngeal erythema present. No oropharyngeal exudate.   Eyes:      General: No scleral icterus.     Extraocular Movements: Extraocular movements intact.      Pupils: Pupils are equal, round, and reactive to light.   Neck:      Vascular: No carotid  bruit.   Cardiovascular:      Rate and Rhythm: Normal rate and regular rhythm.      Pulses: Normal pulses.      Heart sounds: Normal heart sounds. No murmur heard.  Pulmonary:      Effort: Pulmonary effort is normal.      Breath sounds: Normal breath sounds.   Abdominal:      General: Abdomen is flat. Bowel sounds are normal.      Palpations: Abdomen is soft.   Musculoskeletal:      Cervical back: Normal range of motion and neck supple.      Comments: Some chest wall tenderness with palpation over the lower sternal area.   Lymphadenopathy:      Cervical: Cervical adenopathy present.   Neurological:      General: No focal deficit present.      Mental Status: She is alert and oriented to person, place, and time.   Psychiatric:      Comments: Anxious         Assessment/Plan   Problem List Items Addressed This Visit             ICD-10-CM    Anxiety disorder F41.9     On Klonopin.  Follow-up with psychiatrist.         Benign essential hypertension I10     Continue same meds.  Monitor home BP.  She brought her home BP log and has been within normal.         Other screening mammogram Z12.31     Patient refused to go for mammogram in the past but is  willing to go for one.         Asymptomatic menopause Z78.0    Relevant Orders    XR DEXA bone density    Controlled type 2 diabetes mellitus without complication, without long-term current use of insulin (Multi) E11.9    Strep pharyngitis J02.0     Treat with Z-Mike call if not better.           Relevant Medications    azithromycin (Zithromax) 250 mg tablet    Depression, recurrent (CMS-HCC) F33.9     Managed by her psychiatrist.         Chest pain - Primary R07.9     Seen in ER did rule out for ACS.  Has an appointment with cardiologist soon at [facility] Dr. Morel.         Relevant Orders    Referral to Cardiology    Sore throat J02.9     Rapid strep test at the office was positive.  Patient has allergy to penicillin.  Will treat with Zithromax.  Gargle with warm salty water.          Relevant Orders    POCT Rapid Strep A manually resulted (Completed)    Hyponatremia E87.1     Recheck labs.         Relevant Orders    Comprehensive metabolic panel

## 2024-06-08 PROBLEM — F33.9 DEPRESSION, RECURRENT (CMS-HCC): Status: ACTIVE | Noted: 2024-06-08

## 2024-06-08 PROBLEM — J02.9 SORE THROAT: Status: ACTIVE | Noted: 2024-06-08

## 2024-06-08 PROBLEM — R07.9 CHEST PAIN: Status: ACTIVE | Noted: 2024-06-08

## 2024-06-08 PROBLEM — E87.1 HYPONATREMIA: Status: ACTIVE | Noted: 2024-06-08

## 2024-06-08 ASSESSMENT — ENCOUNTER SYMPTOMS
SORE THROAT: 1
NERVOUS/ANXIOUS: 1

## 2024-06-09 NOTE — ASSESSMENT & PLAN NOTE
Rapid strep test at the office was positive.  Patient has allergy to penicillin.  Will treat with Zithromax.  Gargle with warm salty water.

## 2024-06-10 ENCOUNTER — LAB (OUTPATIENT)
Dept: LAB | Facility: LAB | Age: 69
End: 2024-06-10
Payer: MEDICARE

## 2024-06-10 ENCOUNTER — APPOINTMENT (OUTPATIENT)
Dept: PRIMARY CARE | Facility: CLINIC | Age: 69
End: 2024-06-10
Payer: MEDICARE

## 2024-06-10 ENCOUNTER — TELEPHONE (OUTPATIENT)
Dept: PRIMARY CARE | Facility: CLINIC | Age: 69
End: 2024-06-10

## 2024-06-10 DIAGNOSIS — J02.0 STREP PHARYNGITIS: ICD-10-CM

## 2024-06-10 DIAGNOSIS — E87.1 HYPONATREMIA: ICD-10-CM

## 2024-06-10 DIAGNOSIS — Z86.39 HISTORY OF HYPOTHYROIDISM: ICD-10-CM

## 2024-06-10 LAB
ALBUMIN SERPL BCP-MCNC: 4.7 G/DL (ref 3.4–5)
ALP SERPL-CCNC: 59 U/L (ref 33–136)
ALT SERPL W P-5'-P-CCNC: 9 U/L (ref 7–45)
ANION GAP SERPL CALC-SCNC: 12 MMOL/L (ref 10–20)
AST SERPL W P-5'-P-CCNC: 17 U/L (ref 9–39)
BILIRUB SERPL-MCNC: 0.8 MG/DL (ref 0–1.2)
BUN SERPL-MCNC: 11 MG/DL (ref 6–23)
CALCIUM SERPL-MCNC: 9.9 MG/DL (ref 8.6–10.3)
CHLORIDE SERPL-SCNC: 95 MMOL/L (ref 98–107)
CO2 SERPL-SCNC: 28 MMOL/L (ref 21–32)
CREAT SERPL-MCNC: 0.7 MG/DL (ref 0.5–1.05)
EGFRCR SERPLBLD CKD-EPI 2021: >90 ML/MIN/1.73M*2
GLUCOSE SERPL-MCNC: 87 MG/DL (ref 74–99)
POTASSIUM SERPL-SCNC: 3.6 MMOL/L (ref 3.5–5.3)
PROT SERPL-MCNC: 7.7 G/DL (ref 6.4–8.2)
SODIUM SERPL-SCNC: 131 MMOL/L (ref 136–145)

## 2024-06-10 PROCEDURE — 36415 COLL VENOUS BLD VENIPUNCTURE: CPT

## 2024-06-10 PROCEDURE — 86060 ANTISTREPTOLYSIN O TITER: CPT

## 2024-06-10 PROCEDURE — 84443 ASSAY THYROID STIM HORMONE: CPT

## 2024-06-10 PROCEDURE — 80053 COMPREHEN METABOLIC PANEL: CPT

## 2024-06-10 PROCEDURE — 86140 C-REACTIVE PROTEIN: CPT

## 2024-06-10 NOTE — TELEPHONE ENCOUNTER
Pt was in last week - was tested for strep and was positive - pt is still not feeling well - fatigue, shaking, blood sugar evaluated - throat still hurts a bit - please call and advise what to do

## 2024-06-10 NOTE — TELEPHONE ENCOUNTER
Added to sherif   Complex Repair And Epidermal Autograft Text: The defect edges were debeveled with a #15 scalpel blade.  The primary defect was closed partially with a complex linear closure.  Given the location of the defect, shape of the defect and the proximity to free margins an epidermal autograft was deemed most appropriate to repair the remaining defect.  The graft was trimmed to fit the size of the remaining defect.  The graft was then placed in the primary defect, oriented appropriately, and sutured into place.

## 2024-06-11 DIAGNOSIS — J02.0 STREP PHARYNGITIS: Primary | ICD-10-CM

## 2024-06-11 LAB
ASO AB SERPL-ACNC: 26 IU/ML (ref 0–250)
CRP SERPL-MCNC: <0.1 MG/DL

## 2024-06-12 ENCOUNTER — OFFICE VISIT (OUTPATIENT)
Dept: PRIMARY CARE | Facility: CLINIC | Age: 69
End: 2024-06-12
Payer: MEDICARE

## 2024-06-12 VITALS
DIASTOLIC BLOOD PRESSURE: 78 MMHG | OXYGEN SATURATION: 98 % | SYSTOLIC BLOOD PRESSURE: 148 MMHG | BODY MASS INDEX: 19.1 KG/M2 | WEIGHT: 97.8 LBS | HEART RATE: 88 BPM

## 2024-06-12 DIAGNOSIS — E03.9 ACQUIRED HYPOTHYROIDISM: ICD-10-CM

## 2024-06-12 DIAGNOSIS — J02.9 SORE THROAT: Primary | ICD-10-CM

## 2024-06-12 DIAGNOSIS — Z86.39 HISTORY OF HYPOTHYROIDISM: ICD-10-CM

## 2024-06-12 DIAGNOSIS — J02.0 STREP PHARYNGITIS: ICD-10-CM

## 2024-06-12 DIAGNOSIS — R53.83 OTHER FATIGUE: ICD-10-CM

## 2024-06-12 DIAGNOSIS — B00.9 HERPES SIMPLEX TYPE 1 INFECTION: ICD-10-CM

## 2024-06-12 DIAGNOSIS — E87.1 HYPONATREMIA: ICD-10-CM

## 2024-06-12 PROBLEM — F40.01 AGORAPHOBIA WITH PANIC DISORDER: Status: RESOLVED | Noted: 2023-08-28 | Resolved: 2024-06-12

## 2024-06-12 PROBLEM — B37.0 CANDIDIASIS OF MOUTH: Status: RESOLVED | Noted: 2024-05-30 | Resolved: 2024-06-12

## 2024-06-12 LAB
POC RAPID STREP: NEGATIVE
TSH SERPL-ACNC: 3.8 MIU/L (ref 0.44–3.98)

## 2024-06-12 PROCEDURE — 87880 STREP A ASSAY W/OPTIC: CPT | Performed by: INTERNAL MEDICINE

## 2024-06-12 PROCEDURE — 1160F RVW MEDS BY RX/DR IN RCRD: CPT | Performed by: INTERNAL MEDICINE

## 2024-06-12 PROCEDURE — 1157F ADVNC CARE PLAN IN RCRD: CPT | Performed by: INTERNAL MEDICINE

## 2024-06-12 PROCEDURE — 3062F POS MACROALBUMINURIA REV: CPT | Performed by: INTERNAL MEDICINE

## 2024-06-12 PROCEDURE — 3048F LDL-C <100 MG/DL: CPT | Performed by: INTERNAL MEDICINE

## 2024-06-12 PROCEDURE — 99214 OFFICE O/P EST MOD 30 MIN: CPT | Performed by: INTERNAL MEDICINE

## 2024-06-12 PROCEDURE — 3044F HG A1C LEVEL LT 7.0%: CPT | Performed by: INTERNAL MEDICINE

## 2024-06-12 PROCEDURE — 3078F DIAST BP <80 MM HG: CPT | Performed by: INTERNAL MEDICINE

## 2024-06-12 PROCEDURE — 3077F SYST BP >= 140 MM HG: CPT | Performed by: INTERNAL MEDICINE

## 2024-06-12 PROCEDURE — 1159F MED LIST DOCD IN RCRD: CPT | Performed by: INTERNAL MEDICINE

## 2024-06-12 RX ORDER — VALACYCLOVIR HYDROCHLORIDE 1 G/1
TABLET, FILM COATED ORAL
Qty: 20 TABLET | Refills: 3 | Status: SHIPPED | OUTPATIENT
Start: 2024-06-12

## 2024-06-12 RX ORDER — MONTELUKAST SODIUM 10 MG/1
1 TABLET ORAL
COMMUNITY
Start: 2024-03-25

## 2024-06-12 ASSESSMENT — PATIENT HEALTH QUESTIONNAIRE - PHQ9
2. FEELING DOWN, DEPRESSED OR HOPELESS: NOT AT ALL
SUM OF ALL RESPONSES TO PHQ9 QUESTIONS 1 AND 2: 0
1. LITTLE INTEREST OR PLEASURE IN DOING THINGS: NOT AT ALL

## 2024-06-12 NOTE — PROGRESS NOTES
Subjective   Patient ID: Janneth Celis is a 69 y.o. female who presents for Recheck strep test  (Patient is here to have a strep recheck since last week the results were positive.) and Fatigue (Patient is here for extreme fatigue. ).    Patient is here to follow-up on her strep, she still feels fatigue no chest pain no when she palpates her chest wall, denies any exertional dyspnea, no fever or chills or sore throat improved with current antibiotic.  She is here to go over her lab result.  She has been under stress, currently staying in air B&[facility] because of some window remodeling her house, also her best friend  recently.  She is planning on calling her cardiologist Dr. Gamboa for appointment.         Review of Systems   Constitutional:  Positive for fatigue.   Respiratory:          As HPI.   Cardiovascular:         As HPI.       Objective   /78 (BP Location: Left arm, Patient Position: Sitting)   Pulse 88   Wt (!) 44.4 kg (97 lb 12.8 oz)   SpO2 98%   BMI 19.10 kg/m²     Physical Exam  Constitutional:       General: She is not in acute distress.     Appearance: Normal appearance.   HENT:      Head: Normocephalic and atraumatic.      Right Ear: Tympanic membrane, ear canal and external ear normal.      Left Ear: Tympanic membrane, ear canal and external ear normal.      Mouth/Throat:      Mouth: Mucous membranes are moist.      Pharynx: No oropharyngeal exudate or posterior oropharyngeal erythema.   Eyes:      Extraocular Movements: Extraocular movements intact.      Pupils: Pupils are equal, round, and reactive to light.   Cardiovascular:      Rate and Rhythm: Normal rate and regular rhythm.      Heart sounds: Normal heart sounds.   Pulmonary:      Effort: Pulmonary effort is normal.      Breath sounds: Normal breath sounds. No wheezing or rhonchi.   Abdominal:      General: Abdomen is flat. Bowel sounds are normal. There is no distension.      Palpations: Abdomen is soft.   Musculoskeletal:          General: Normal range of motion.      Cervical back: Normal range of motion and neck supple.      Right lower leg: No edema.      Left lower leg: No edema.   Lymphadenopathy:      Cervical: No cervical adenopathy.   Skin:     General: Skin is warm.   Neurological:      General: No focal deficit present.      Mental Status: She is alert and oriented to person, place, and time.   Psychiatric:         Mood and Affect: Mood normal.         Behavior: Behavior normal.         Assessment/Plan   Problem List Items Addressed This Visit             ICD-10-CM    Herpes simplex type 1 infection B00.9    Relevant Medications    valACYclovir (Valtrex) 1 gram tablet    Hypothyroidism E03.9     Check TSH         History of hypothyroidism Z86.39    Relevant Orders    TSH with reflex to Free T4 if abnormal (Completed)    Strep pharyngitis J02.0     Improved with Zithromax.  Continue resting.         Sore throat - Primary J02.9     Repeat rapid strep is negative.         Relevant Orders    POCT Rapid Strep A manually resulted (Completed)    Hyponatremia E87.1     Patient has been drinking enormous amount of water.  She will restrict down to 1200 cc daily.          Other Visit Diagnoses         Codes    Other fatigue     R53.83

## 2024-06-13 ASSESSMENT — ENCOUNTER SYMPTOMS: FATIGUE: 1

## 2024-06-18 ENCOUNTER — TELEPHONE (OUTPATIENT)
Dept: PRIMARY CARE | Facility: CLINIC | Age: 69
End: 2024-06-18
Payer: MEDICARE

## 2024-06-18 DIAGNOSIS — M79.7 FIBROMYALGIA: Primary | ICD-10-CM

## 2024-06-18 NOTE — TELEPHONE ENCOUNTER
Pt called asking for referral to see Dr. Bush due to her fibromyalgia muscle skeleton pain?    Please call pt at  [phone]

## 2024-06-26 ENCOUNTER — APPOINTMENT (OUTPATIENT)
Dept: RADIOLOGY | Facility: CLINIC | Age: 69
End: 2024-06-26
Payer: MEDICARE

## 2024-06-26 ENCOUNTER — APPOINTMENT (OUTPATIENT)
Dept: CARDIOLOGY | Facility: CLINIC | Age: 69
End: 2024-06-26
Payer: MEDICARE

## 2024-06-26 VITALS
OXYGEN SATURATION: 96 % | HEIGHT: 60 IN | SYSTOLIC BLOOD PRESSURE: 134 MMHG | WEIGHT: 96.8 LBS | DIASTOLIC BLOOD PRESSURE: 84 MMHG | HEART RATE: 84 BPM | BODY MASS INDEX: 19.01 KG/M2 | RESPIRATION RATE: 19 BRPM

## 2024-06-26 DIAGNOSIS — I51.7 ATRIAL ENLARGEMENT, BILATERAL: ICD-10-CM

## 2024-06-26 DIAGNOSIS — R00.2 PALPITATIONS: ICD-10-CM

## 2024-06-26 DIAGNOSIS — G47.30 SLEEP APNEA, UNSPECIFIED TYPE: ICD-10-CM

## 2024-06-26 DIAGNOSIS — R94.31 ABNORMAL EKG: ICD-10-CM

## 2024-06-26 DIAGNOSIS — I10 BENIGN ESSENTIAL HYPERTENSION: ICD-10-CM

## 2024-06-26 DIAGNOSIS — R06.00 PND (PAROXYSMAL NOCTURNAL DYSPNEA): Primary | ICD-10-CM

## 2024-06-26 PROCEDURE — 3079F DIAST BP 80-89 MM HG: CPT | Performed by: NURSE PRACTITIONER

## 2024-06-26 PROCEDURE — 93000 ELECTROCARDIOGRAM COMPLETE: CPT | Performed by: NURSE PRACTITIONER

## 2024-06-26 PROCEDURE — 1159F MED LIST DOCD IN RCRD: CPT | Performed by: NURSE PRACTITIONER

## 2024-06-26 PROCEDURE — 3062F POS MACROALBUMINURIA REV: CPT | Performed by: NURSE PRACTITIONER

## 2024-06-26 PROCEDURE — 1160F RVW MEDS BY RX/DR IN RCRD: CPT | Performed by: NURSE PRACTITIONER

## 2024-06-26 PROCEDURE — 3044F HG A1C LEVEL LT 7.0%: CPT | Performed by: NURSE PRACTITIONER

## 2024-06-26 PROCEDURE — 1157F ADVNC CARE PLAN IN RCRD: CPT | Performed by: NURSE PRACTITIONER

## 2024-06-26 PROCEDURE — 1036F TOBACCO NON-USER: CPT | Performed by: NURSE PRACTITIONER

## 2024-06-26 PROCEDURE — 3075F SYST BP GE 130 - 139MM HG: CPT | Performed by: NURSE PRACTITIONER

## 2024-06-26 PROCEDURE — 3048F LDL-C <100 MG/DL: CPT | Performed by: NURSE PRACTITIONER

## 2024-06-26 PROCEDURE — 99204 OFFICE O/P NEW MOD 45 MIN: CPT | Performed by: NURSE PRACTITIONER

## 2024-06-26 RX ORDER — ESCITALOPRAM OXALATE 5 MG/1
1 TABLET ORAL
COMMUNITY
Start: 2024-06-22

## 2024-06-26 RX ORDER — CLONAZEPAM 0.5 MG/1
0.5 TABLET ORAL 3 TIMES DAILY PRN
COMMUNITY
Start: 2024-06-17

## 2024-06-26 RX ORDER — METOPROLOL SUCCINATE 25 MG/1
25 TABLET, EXTENDED RELEASE ORAL DAILY
Qty: 90 TABLET | Refills: 3 | Status: SHIPPED | OUTPATIENT
Start: 2024-06-26 | End: 2025-06-26

## 2024-06-26 NOTE — PROGRESS NOTES
"Name : Janneth Celis   : 1955   MRN : [id number]   ENC Date : 2024    CC:   NPV- Chest Pain (2), palpitations, PND     HPI:    Janneth Celis is a 69 y.o. female with PMHx sig for HTN, Hypothyroidism, DM II, Severe Fibromyalgia & Anxiety who presents today as above    ER visit 6/3/24 at [facility] for c/o intermittent chest pain for 2 weeks. Troponin 9, 15, 13. Diagnosed with costochondritis, supportive therapy & sent home.    Saw PCP on follow up 24, c/o pharyngitis. Strep positive. Treated with Z-judah.    Her sharp chest pain\"where the ribs meet\", has since resolved with PT & physical adjustment. Palpitations have improved since starting lexapro.    Her largest concern/complaint is that she wakes with adrenaline, can't sleep, wakes up with acute anxiety.    She is under a lot of stress & grieving the loss of her best friend who passed away unexpectedly, Janneth was her advocate for 28 days. Her house is in disarray as her windows are being replaced, vent ducts are tapped off to prevent dust transfer which is making it really hot inside the house in this current heat wave.  She went to 2 different air BN[employer] & a horrible hotel.    Does suffer from panic & anxiety disorder. She is seeing a psychiatric specialist.    Reports she saw Dr. Gamboa in the past who put her on metoprolol. She is currently only taking 25mg of tartrate once a day      Retired special  in [place]    CV Diagnostics:  EKG 24: Sinus Rhythm, HR 89 bpm, atrial enlargement    ROS: unless otherwise noted in the history of present illness, all other systems were reviewed and they are negative for complaints     PMH:  As above    PSH:  She has a past surgical history that includes  section, classic (2012); Other surgical history (2012); Dilation and curettage of uterus (2012); Other surgical history (2012);  section, low transverse (, ); and [place] tooth extraction " (1972).    SHx:  She reports that she has never smoked. She has never used smokeless tobacco. She reports that she does not drink alcohol and does not use drugs.    FHx:  Family History   Problem Relation Name Age of Onset    Other (arthralgia) Mother Dot     Atrial fibrillation Mother Dot     Heart disease Mother Dot     Hypertension Mother Dot     Hypothyroidism Mother Dot     Heart attack Mother Dot     Mitral valve prolapse Mother Dot     Dementia Mother Dot         vascular    Arthritis Mother Dot     Hypertension Father Kurt     Hypothyroidism Father Kurt     Mitral valve prolapse Sister Amy     Hypertension Sister Amy     Hypertension Sister Anya       Allergies:  Penicillins    Outpatient Medications:  Current Outpatient Medications   Medication Instructions    bimatoprost (Lumigan) 0.01 % ophthalmic solution 1 drop, Both Eyes, Nightly    clonazePAM (KlonoPIN) 1 mg tablet 1.5 tablets, oral, Nightly    clonazePAM (KLONOPIN) 0.5 mg, oral, 3 times daily PRN    escitalopram (Lexapro) 5 mg tablet 1 tablet, oral, Daily (0630)    fluticasone (Flonase) 50 mcg/actuation nasal spray 2 sprays, Each Nostril, Daily PRN    levothyroxine (SYNTHROID, LEVOXYL) 25 mcg, oral, Daily before breakfast, SYNTHROID ONLY.     metFORMIN (GLUCOPHAGE) 1,000 mg, oral, Daily    metoprolol succinate XL (TOPROL-XL) 25 mg, oral, Daily, Do not crush or chew.    montelukast (Singulair) 10 mg tablet 1 tablet, oral, Daily (0630)    pantoprazole (PROTONIX) 20 mg, oral, As needed    valACYclovir (Valtrex) 1 gram tablet Take 2 tablets twice a day for 1 day for flare ups     Last Recorded Vitals:  Vitals:    06/26/24 1223   BP: 134/84   BP Location: Left arm   Patient Position: Sitting   Pulse: 84   Resp: 19   SpO2: 96%   Weight: (!) 43.9 kg (96 lb 12.8 oz)   Height: 1.524 m (5')     Physical Exam:  On exam Ms. Janneth Celis appears her stated age, is alert and oriented x3, and in no acute distress. Her sclera are anicteric and her oropharynx  has moist mucous membranes. Her neck is supple and without thyromegaly. The JVP is ~5 cm of water above the right atrium. Her cardiac exam has regular rhythm, normal S1, S2. No S3/4. There are no murmurs. Her lungs are clear to auscultation bilaterally and there is no dullness to percussion. Her abdomen is soft, nontender with normoactive bowel sounds. There is no HJR. The extremities are warm and without edema. The skin is dry. There is no rash present. The distal pulses are 2-3+ in all four extremities. Her mood and affect are appropriate for todays encounter.      Last Labs:  CBC -  Lab Results   Component Value Date    WBC 4.4 04/29/2022    HGB 13.4 04/29/2022    HCT 41.9 04/29/2022    MCV 94 04/29/2022     04/29/2022       CMP -  Lab Results   Component Value Date    CALCIUM 9.9 06/10/2024    PHOS 3.9 04/18/2023    PROT 7.7 06/10/2024    ALBUMIN 4.7 06/10/2024    AST 17 06/10/2024    ALT 9 06/10/2024    ALKPHOS 59 06/10/2024    BILITOT 0.8 06/10/2024       LIPID PANEL -   Lab Results   Component Value Date    CHOL 139 05/09/2024    TRIG 49 05/09/2024    HDL 76.3 05/09/2024    CHHDL 1.8 05/09/2024    LDLF 47 04/29/2022    VLDL 10 05/09/2024    NHDL 63 05/09/2024       RENAL FUNCTION PANEL -   Lab Results   Component Value Date    GLUCOSE 87 06/10/2024     (L) 06/10/2024    K 3.6 06/10/2024    CL 95 (L) 06/10/2024    CO2 28 06/10/2024    ANIONGAP 12 06/10/2024    BUN 11 06/10/2024    CREATININE 0.70 06/10/2024    CALCIUM 9.9 06/10/2024    PHOS 3.9 04/18/2023    ALBUMIN 4.7 06/10/2024        Lab Results   Component Value Date    HGBA1C 6.0 (H) 05/07/2024     I have reviewed the above labs & diagnostics    Assessment/Plan:  Palpitations.  PND  Abnormal EKG, Atrial enlargement by voltage criteria.  No anemia, no electrolyte abnormalities, thyroid function is balanced  - minimize any caffeine intake, worsens sleep & palpitations  - 72hr event monitor to assess for arrhythmias  - echocardiogram to assess  for valvular abnormalities, structure, function  - referral to sleep medicine as symptoms very similar to sleep apnea  - taking immediate release metoprolol once a day, so will switch her to extended release       Follow up after testing to review results.    Tracy M Schwab, APRN-CNP

## 2024-06-26 NOTE — PATIENT INSTRUCTIONS
- switch to metoprolol succinate 25mg once a day. This is extended release.  - referral to sleep medicine  - Echocardiogram (ultrasound of your heart) to assess the function as well as for any valve abnormalities   - 3 day heart monitor  - follow up after to review results with me     It was my pleasure to meet you. I look forward to being your cardiac Nurse Practitioner. I am a huge believer in communicating with my patients. Please contact me at any time, if anything is not clear to you regarding anything we have discussed, or if new questions occur to you.     It was my pleasure to meet you. I look forward to being your cardiac Nurse Practitioner. I am a huge believer in communicating with my patients. Please contact me at any time, if anything is not clear to you regarding anything we have discussed, or if new questions occur to you.

## 2024-06-28 ENCOUNTER — APPOINTMENT (OUTPATIENT)
Dept: RADIOLOGY | Facility: HOSPITAL | Age: 69
End: 2024-06-28
Payer: MEDICARE

## 2024-07-01 ENCOUNTER — TELEPHONE (OUTPATIENT)
Dept: CARDIOLOGY | Facility: CLINIC | Age: 69
End: 2024-07-01
Payer: MEDICARE

## 2024-07-01 NOTE — TELEPHONE ENCOUNTER
Called patient and advised.    Patient is having side effects Metoprolol succinate lightheaded drowsy, dizzy, nausea. Could she change back to the original metoprolol tartrate.

## 2024-07-02 ENCOUNTER — APPOINTMENT (OUTPATIENT)
Dept: RADIOLOGY | Facility: HOSPITAL | Age: 69
End: 2024-07-02
Payer: MEDICARE

## 2024-07-17 ENCOUNTER — APPOINTMENT (OUTPATIENT)
Dept: PRIMARY CARE | Facility: CLINIC | Age: 69
End: 2024-07-17
Payer: MEDICARE

## 2024-07-19 ENCOUNTER — APPOINTMENT (OUTPATIENT)
Dept: RADIOLOGY | Facility: HOSPITAL | Age: 69
End: 2024-07-19
Payer: MEDICARE

## 2024-07-29 ENCOUNTER — HOSPITAL ENCOUNTER (OUTPATIENT)
Dept: RADIOLOGY | Facility: HOSPITAL | Age: 69
Discharge: HOME | End: 2024-07-29
Payer: MEDICARE

## 2024-07-29 DIAGNOSIS — Z78.0 ASYMPTOMATIC MENOPAUSE: ICD-10-CM

## 2024-07-29 PROCEDURE — 77080 DXA BONE DENSITY AXIAL: CPT

## 2024-07-29 PROCEDURE — 77080 DXA BONE DENSITY AXIAL: CPT | Performed by: STUDENT IN AN ORGANIZED HEALTH CARE EDUCATION/TRAINING PROGRAM

## 2024-07-29 ASSESSMENT — LIFESTYLE VARIABLES
3_OR_MORE_DRINKS_PER_DAY: N
CURRENT_SMOKER: N

## 2024-07-31 ENCOUNTER — APPOINTMENT (OUTPATIENT)
Dept: RADIOLOGY | Facility: HOSPITAL | Age: 69
End: 2024-07-31
Payer: MEDICARE

## 2024-08-12 ENCOUNTER — TELEPHONE (OUTPATIENT)
Dept: PRIMARY CARE | Facility: CLINIC | Age: 69
End: 2024-08-12
Payer: MEDICARE

## 2024-08-12 NOTE — TELEPHONE ENCOUNTER
Pt of Dr Garduno's. Called in to ask if she would be able to keep her appt for Wednesday. She was diagnosed with sinus infection. Given antibiotics to start and has been on them for 2 days. I advised pt if she has been symptomatic for more than 5 days prior to her appt, on meds for 24-48 hrs, fever free, then we just request she wear a mask. Please advise if pt should reschedule her MCW. Thank you!

## 2024-08-14 ENCOUNTER — APPOINTMENT (OUTPATIENT)
Dept: PRIMARY CARE | Facility: CLINIC | Age: 69
End: 2024-08-14
Payer: MEDICARE

## 2024-08-14 VITALS
DIASTOLIC BLOOD PRESSURE: 82 MMHG | SYSTOLIC BLOOD PRESSURE: 132 MMHG | BODY MASS INDEX: 19.24 KG/M2 | TEMPERATURE: 98.1 F | HEART RATE: 81 BPM | OXYGEN SATURATION: 95 % | HEIGHT: 60 IN | WEIGHT: 98 LBS

## 2024-08-14 DIAGNOSIS — M79.7 FIBROMYALGIA: ICD-10-CM

## 2024-08-14 DIAGNOSIS — J06.9 RECURRENT UPPER RESPIRATORY INFECTION (URI): ICD-10-CM

## 2024-08-14 DIAGNOSIS — F33.9 DEPRESSION, RECURRENT (CMS-HCC): ICD-10-CM

## 2024-08-14 DIAGNOSIS — E11.9 TYPE 2 DIABETES MELLITUS WITHOUT COMPLICATION, WITHOUT LONG-TERM CURRENT USE OF INSULIN (MULTI): ICD-10-CM

## 2024-08-14 DIAGNOSIS — E55.9 VITAMIN D DEFICIENCY: ICD-10-CM

## 2024-08-14 DIAGNOSIS — Z12.31 VISIT FOR SCREENING MAMMOGRAM: ICD-10-CM

## 2024-08-14 DIAGNOSIS — R53.83 OTHER FATIGUE: ICD-10-CM

## 2024-08-14 DIAGNOSIS — E03.9 ACQUIRED HYPOTHYROIDISM: ICD-10-CM

## 2024-08-14 DIAGNOSIS — Z00.00 MEDICARE ANNUAL WELLNESS VISIT, SUBSEQUENT: Primary | ICD-10-CM

## 2024-08-14 DIAGNOSIS — Z00.00 ROUTINE GENERAL MEDICAL EXAMINATION AT HEALTH CARE FACILITY: ICD-10-CM

## 2024-08-14 DIAGNOSIS — E87.1 HYPONATREMIA: ICD-10-CM

## 2024-08-14 DIAGNOSIS — Z12.11 COLON CANCER SCREENING: ICD-10-CM

## 2024-08-14 PROCEDURE — 3075F SYST BP GE 130 - 139MM HG: CPT | Performed by: INTERNAL MEDICINE

## 2024-08-14 PROCEDURE — 99215 OFFICE O/P EST HI 40 MIN: CPT | Performed by: INTERNAL MEDICINE

## 2024-08-14 PROCEDURE — 3079F DIAST BP 80-89 MM HG: CPT | Performed by: INTERNAL MEDICINE

## 2024-08-14 PROCEDURE — 1159F MED LIST DOCD IN RCRD: CPT | Performed by: INTERNAL MEDICINE

## 2024-08-14 PROCEDURE — 1157F ADVNC CARE PLAN IN RCRD: CPT | Performed by: INTERNAL MEDICINE

## 2024-08-14 PROCEDURE — 1123F ACP DISCUSS/DSCN MKR DOCD: CPT | Performed by: INTERNAL MEDICINE

## 2024-08-14 PROCEDURE — 3048F LDL-C <100 MG/DL: CPT | Performed by: INTERNAL MEDICINE

## 2024-08-14 PROCEDURE — 3044F HG A1C LEVEL LT 7.0%: CPT | Performed by: INTERNAL MEDICINE

## 2024-08-14 PROCEDURE — 3062F POS MACROALBUMINURIA REV: CPT | Performed by: INTERNAL MEDICINE

## 2024-08-14 PROCEDURE — 1158F ADVNC CARE PLAN TLK DOCD: CPT | Performed by: INTERNAL MEDICINE

## 2024-08-14 PROCEDURE — 3008F BODY MASS INDEX DOCD: CPT | Performed by: INTERNAL MEDICINE

## 2024-08-14 PROCEDURE — 1160F RVW MEDS BY RX/DR IN RCRD: CPT | Performed by: INTERNAL MEDICINE

## 2024-08-14 PROCEDURE — 1170F FXNL STATUS ASSESSED: CPT | Performed by: INTERNAL MEDICINE

## 2024-08-14 PROCEDURE — G0439 PPPS, SUBSEQ VISIT: HCPCS | Performed by: INTERNAL MEDICINE

## 2024-08-14 ASSESSMENT — ACTIVITIES OF DAILY LIVING (ADL)
DOING_HOUSEWORK: INDEPENDENT
TAKING_MEDICATION: INDEPENDENT
GROCERY_SHOPPING: INDEPENDENT
DRESSING: INDEPENDENT
BATHING: INDEPENDENT
MANAGING_FINANCES: INDEPENDENT

## 2024-08-14 NOTE — PROGRESS NOTES
"Subjective   Patient ID: Janneth Celis is a 69 y.o. female who presents for evaluation of fibromyalgia (referred from Dr. Garduno).    HPI 69-year-old female referred from Dr. Garduno for fibromyalgia.  Her  is present at the appointment today.    She relates that she developed diffuse body pain in 1996, following an upper respiratory infection with a \"weird rash\".  She saw an orthopedic surgeon, who thought perhaps she had lupus.  Reportedly her AMANDA test was borderline positive.  She was evaluated by a rheumatologist at the [facility] named Dr. Cerrato, who diagnosed her with fibromyalgia.  The rheumatologist reassured her that she did not have lupus.  She has never been on prescription medication for fibromyalgia.    She relates that she has been under a lot of stress recently.  Her best friend passed away March 2024.  She notes that she was her friend's advocate for 28 days, and she spent a lot of time in the hospital with her.    The patient also had COVID January 2024.  Symptoms consisted of headache, fatigue and myalgias.  She notes some residual fatigue after recovering from COVID.    She does suffer from anxiety and panic attacks.  She sees a psychiatrist and uses clonazepam.  She is going to be meeting with a counselor to assist with grief counseling.    He had an ER visit Lauren 3, 2024 for chest pain that she had had for about 2 weeks.  It was described as sharp and over the sternum, where her ribs meet the sternum.  Cardiac troponin was negative and chest x-ray was unremarkable.  She was felt to likely have costochondritis.    She also had strep throat June 5, 2024.  She was treated appropriately with an antibiotic.    The patient's complaint today is that she is having diffuse body pain and stiffness, along with difficulty sleeping, nonrestorative sleep and anxiety.  She currently takes sertraline 25 mg daily and clonazepam 0.5 mg 3 times daily and 1.5 mg at bedtime.    She did go to the gym 2 " days ago, she was able to walk three quarters of a mile at a 2.7 mile-per-hour pace.  She notes that prior to when her pain flared she was able to walk longer.    She also complains of dry eyes and dry mouth-she wonders if she should be tested for Sjogren's syndrome.    She is also currently being treated for a sinus infection with Ceftin.    The patient would also like to discuss her bone density.  Screening bone density done 2024 shows osteoporosis, with T-score -3.2 in the lumbar spine.  She has no history of fractures as an adult.  She currently weighs 98 pounds with BMI 19.2.  Her heaviest weight as an adult was 119 pounds.  There is no definite family history of osteoporosis.  Last menstrual period was age 54.  Neither parent fractured hip.    DEXA 2024: T-score -3.2 lumbar spine, -1.4 left total hip, -1.9 left femoral neck    Labs 2024: CMP normal except sodium 131, CRP less than 0.1, TSH 3.8, CBC normal    Medical problem list:  Type 2 diabetes  Palpitations  Glaucoma  Hypothyroidism  Osteoporosis  Fibromyalgia  Agoraphobia with panic disorder  Anxiety  Kidney stones  Essential hypertension and white coat hypertension    Medications: Reviewed as documented  Surgeries:   section  D&C  Hysteroscopy  Bladder cystotomy for extraction of calculus  [place] tooth extraction    Social history: .  She denies use of tobacco.   She denies use of alcohol.    Family history:  Mother-atrial fibrillation, arthritis, dementia, coronary artery disease, hypertension, hypothyroidism, mitral valve prolapse, PMR, OA  Father-hypertension, hypothyroidism  Sister-hypertension, spinal stenosis  Cousin- fibromyalgia    Review of Systems  Constitutional: Denies fever, chills,  or weight loss. C/o fatigue, trouble sleeping, nonrestorative sleep. C/o 1-2 pound weight loss.  Eyes: c/o red and dry eyes, denies blurred vision.  ENT: Denies sore throat or hoarseness.c/o dry mouth.  Cardiovascular: Had sharp  chest pain 6/24, increased with movement and palpation (felt to be costochondritis ).  She gets occasional palpitations.  Respiratory: Denies cough or shortness of breath.  Gastrointestinal: Denies change in bowel habits, denies diarrhea, denies constipation, denies melena or bright red blood per rectum, denies abdominal pain.  Sometimes gets GERD related to spicy food.  Musculoskeletal: Complains of diffuse body pain, which is most pronounced in shoulders and hips.  Sometimes has back pain in the morning.  Skin: Denies rashes, nodules or other skin lesions.  Neurologic: Denies  dizziness, weakness numbness or tingling.  She sometimes gets headaches related to hayfever.   Psychiatric: Denies confusion, complains of difficulty sleeping with nonrestorative sleep.  Endocrine: Denies goiter, has history of hypothyroidism.  Also has diabetes for which she takes metformin.      Objective   Visit Vitals  /88 (BP Location: Left arm, Patient Position: Sitting, BP Cuff Size: Adult)   Pulse 100   Temp 37.3 °C (99.2 °F) (Skin)   Resp 16   Ht 1.524 m (5')   Wt (!) 44.6 kg (98 lb 6.4 oz)   SpO2 99%   BMI 19.22 kg/m²   Smoking Status Never   BSA 1.37 m²        Physical Exam  General appearance: Slender female, appears stated age.  She is in no acute distress.  HEENT: PERRL, EOMI  Neck: Supple, no nodes.  CV: RRR, no MGR.  Lungs: Clear, no rales or wheezes.  Abdomen: Soft, nontender. No hepatosplenomegaly.  Extremities:  No cyanosis, clubbing, or edema.  MS: No synovitis.         Mild bony prominence of several DIP joints and first CMC joint bilaterally, consistent with osteoarthritis.          Widespread pain index score is 8 out of 19, symptom severity score is 7 out of 12 (consistent with diagnosis of fibromyalgia).  Skin: No rashes or nodules.      Assessment/Plan   Problem List Items Addressed This Visit             ICD-10-CM    Fibromyalgia M79.7    Relevant Medications    naltrexone (Naltrex) 4.5 mg capsule     naltrexone (Naltrex) 4.5 mg capsule    Body mass index (BMI) 19.9 or less, adult Z68.1     Other Visit Diagnoses         Codes    Osteoporosis, unspecified osteoporosis type, unspecified pathological fracture presence    -  Primary M81.0    Relevant Medications    alendronate (Fosamax) 70 mg tablet    Arthritis     M19.90    Relevant Orders    Sedimentation Rate    AMANDA with Reflex to JACQUES    Citrulline Antibody, IgG    Rheumatoid Factor            Fibromyalgia-first diagnosed in 1996. Has never taken prescription medication. Discussed medications that might be helpful- recommend naltrexone 4.5 mg daily (least likely to cause side effects).  I encoraged her to continue exercising on treadmill. I recommend try kailash-Chi.    History of low positive AMANDA- likely false positive,    Osteoporosis- Risk factors are post menopausal status, , slight body weight. Recommend alendronate 70 mg orally once weekly. Warned of risk of esophagitis, ONJ, and atypical hip fractures. Advised on proper dosing instructions    Dry mouth- doubt Sjogren's syndrome, but ordered AMANDA with reflex panel.    Plan:  Start alendronate 70 mg orally once weekly. Take this 30 minutes before eating for the day. Drink 6-8 ounces of water with this. Please make sure you sit upright for 30 minutes after taking.  Start naltrexone 4.5 mg daily.  Recommend try Kailash Chi.  Continue low impact exercise such as treadmill.  Follow-up in 3 months.

## 2024-08-14 NOTE — PROGRESS NOTES
"Subjective   Patient ID: Janneth Celis is a 69 y.o. female who presents for Medicare Annual Wellness Visit Subsequent (Patient is here for an annual Medicare wellness visit and follow up. ) and Follow-up.    Here for MCR and follow up on HTN,DM-2,hypothyroid and thyroid nodule.  She did not go for her mammogram yet or colonoscopy, she is here to go over her DEXA scan result, she does have an appointment to see the rheumatologist Dr. Bush tomorrow and she wanted to discuss her fibromyalgia and/or all of her issues and the DEXA scan results again with her.  She has multiple concerns and complain, her  also came into the exam room toward the end of her visit and went over all of her issues that she has been having:  She had history of fibromyalgia and she did relates that she developed diffuse body pain in 1996, following an upper respiratory infection with a \"weird rash\".  She saw an orthopedic surgeon, who thought perhaps she had lupus.  Reportedly her AMANDA test was borderline positive.  She was evaluated by a rheumatologist at the [facility] named Dr. Cerrato, who diagnosed her with fibromyalgia few years ago.  The rheumatologist reassured her that she did not have lupus.  She has never been on prescription medication for fibromyalgia.     She relates that she has been under a lot of stress recently.  Her best friend passed away March 2024.  She notes that she was her friend's advocate for 28 days, and she spent a lot of time in the hospital with her.     The patient also had COVID January 2024.  Symptoms consisted of headache, fatigue and myalgias.  She notes some residual fatigue after recovering from COVID.     She does suffer from anxiety and panic attacks.  She sees a psychiatrist and uses clonazepam.  She is going to be meeting with a counselor to assist with grief counseling.     He had an ER visit Lauren 3, 2024 for chest pain that she had had for about 2 weeks.  It was described as sharp and over the " sternum, where her ribs meet the sternum.  Cardiac troponin was negative and chest x-ray was unremarkable.  She was felt to likely have costochondritis.     She also had strep throat June 5, 2024.  She was treated appropriately with an antibiotic.     The patient's complaint today is that she is having diffuse body pain and stiffness, along with difficulty sleeping, nonrestorative sleep and anxiety.  She currently takes sertraline 25 mg daily and clonazepam 0.5 mg 3 times daily and 1.5 mg at bedtime.     She did go to the gym 2 days ago, she was able to walk three quarters of a mile at a 2.7 mile-per-hour pace.  She notes that prior to when her pain flared she was able to walk longer.     She also complains of dry eyes and dry mouth-she wonders if she should be tested for Sjogren's syndrome.    She has been currently treated for acute sinusitis and is on antibiotic.  Eye exam up to date with Dr zheng.no retinopathy.  She sees endo Dr Herrera now in [place].  Home BP at goal,average 110/70.she has white coat syndrome.  Pt never gain weight despite eating enough(run in the family).         Review of Systems   Constitutional:  Positive for fatigue.   HENT:          As HPI, sinusitis is improving with current antibiotic.   Respiratory: Negative.     Cardiovascular: Negative.    Gastrointestinal: Negative.    Musculoskeletal:         As HPI   Psychiatric/Behavioral:          Has depression anxiety and PTSD, seen by her psychiatrist.       Objective   /82 (BP Location: Left arm, Patient Position: Sitting)   Pulse 81   Temp 36.7 °C (98.1 °F) (Temporal)   Ht 1.524 m (5')   Wt (!) 44.5 kg (98 lb)   SpO2 95%   BMI 19.14 kg/m²     Physical Exam  Vitals reviewed.   Constitutional:       General: She is not in acute distress.     Appearance: Normal appearance.   HENT:      Head: Normocephalic and atraumatic.      Right Ear: Tympanic membrane and ear canal normal.      Left Ear: Tympanic membrane and ear canal  normal.      Mouth/Throat:      Pharynx: No oropharyngeal exudate or posterior oropharyngeal erythema.   Eyes:      Extraocular Movements: Extraocular movements intact.      Pupils: Pupils are equal, round, and reactive to light.   Cardiovascular:      Rate and Rhythm: Normal rate and regular rhythm.      Pulses: Normal pulses.      Heart sounds: Normal heart sounds. No murmur heard.  Pulmonary:      Effort: Pulmonary effort is normal.      Breath sounds: Normal breath sounds.   Abdominal:      General: Abdomen is flat. Bowel sounds are normal.      Palpations: Abdomen is soft.   Musculoskeletal:         General: Normal range of motion.      Cervical back: Normal range of motion and neck supple.   Neurological:      General: No focal deficit present.      Mental Status: She is alert and oriented to person, place, and time.   Psychiatric:      Comments: Feeling anxious and depressed.         Assessment/Plan   Problem List Items Addressed This Visit             ICD-10-CM    Hypothyroidism E03.9    Type 2 diabetes mellitus (Multi) E11.9    Relevant Orders    Referral to Nutrition Services    Medicare annual wellness visit, subsequent - Primary Z00.00     All of hers and her 's question were answered.         Colon cancer screening Z12.11    Relevant Orders    Colonoscopy Screening; Average Risk Patient    Fibromyalgia M79.7    Depression, recurrent (CMS-HCC) F33.9    Hyponatremia E87.1    Other fatigue R53.83    Relevant Orders    Comprehensive Metabolic Panel    CBC and Auto Differential    Vitamin B12     Other Visit Diagnoses         Codes    Routine general medical examination at health care facility     Z00.00    Recurrent upper respiratory infection (URI)     J06.9    Relevant Orders    Referral to Immunology    Visit for screening mammogram     Z12.31    Relevant Orders    BI mammo bilateral screening tomosynthesis    Vitamin D deficiency     E55.9    Relevant Orders    Vitamin D 25-Hydroxy,Total (for  eval of Vitamin D levels)

## 2024-08-15 ENCOUNTER — APPOINTMENT (OUTPATIENT)
Dept: RHEUMATOLOGY | Facility: CLINIC | Age: 69
End: 2024-08-15
Payer: MEDICARE

## 2024-08-15 VITALS
HEIGHT: 60 IN | TEMPERATURE: 99.2 F | RESPIRATION RATE: 16 BRPM | BODY MASS INDEX: 19.32 KG/M2 | OXYGEN SATURATION: 99 % | HEART RATE: 100 BPM | SYSTOLIC BLOOD PRESSURE: 153 MMHG | DIASTOLIC BLOOD PRESSURE: 88 MMHG | WEIGHT: 98.4 LBS

## 2024-08-15 DIAGNOSIS — M81.0 OSTEOPOROSIS, UNSPECIFIED OSTEOPOROSIS TYPE, UNSPECIFIED PATHOLOGICAL FRACTURE PRESENCE: Primary | ICD-10-CM

## 2024-08-15 DIAGNOSIS — M19.90 ARTHRITIS: ICD-10-CM

## 2024-08-15 DIAGNOSIS — M79.7 FIBROMYALGIA: ICD-10-CM

## 2024-08-15 PROCEDURE — 99205 OFFICE O/P NEW HI 60 MIN: CPT | Performed by: INTERNAL MEDICINE

## 2024-08-15 PROCEDURE — 1160F RVW MEDS BY RX/DR IN RCRD: CPT | Performed by: INTERNAL MEDICINE

## 2024-08-15 PROCEDURE — 1157F ADVNC CARE PLAN IN RCRD: CPT | Performed by: INTERNAL MEDICINE

## 2024-08-15 PROCEDURE — 1036F TOBACCO NON-USER: CPT | Performed by: INTERNAL MEDICINE

## 2024-08-15 PROCEDURE — 3008F BODY MASS INDEX DOCD: CPT | Performed by: INTERNAL MEDICINE

## 2024-08-15 PROCEDURE — 3077F SYST BP >= 140 MM HG: CPT | Performed by: INTERNAL MEDICINE

## 2024-08-15 PROCEDURE — 1123F ACP DISCUSS/DSCN MKR DOCD: CPT | Performed by: INTERNAL MEDICINE

## 2024-08-15 PROCEDURE — 3062F POS MACROALBUMINURIA REV: CPT | Performed by: INTERNAL MEDICINE

## 2024-08-15 PROCEDURE — 3044F HG A1C LEVEL LT 7.0%: CPT | Performed by: INTERNAL MEDICINE

## 2024-08-15 PROCEDURE — 3079F DIAST BP 80-89 MM HG: CPT | Performed by: INTERNAL MEDICINE

## 2024-08-15 PROCEDURE — 1159F MED LIST DOCD IN RCRD: CPT | Performed by: INTERNAL MEDICINE

## 2024-08-15 PROCEDURE — 3048F LDL-C <100 MG/DL: CPT | Performed by: INTERNAL MEDICINE

## 2024-08-15 RX ORDER — ALENDRONATE SODIUM 70 MG/1
70 TABLET ORAL
Qty: 12 TABLET | Refills: 3 | Status: SHIPPED | OUTPATIENT
Start: 2024-08-15 | End: 2025-08-15

## 2024-08-15 RX ORDER — NALTREXONE HCL 4.5 MG
4.5 CAPSULE ORAL DAILY
Qty: 30 CAPSULE | Refills: 5 | Status: SHIPPED | OUTPATIENT
Start: 2024-08-15

## 2024-08-15 RX ORDER — SERTRALINE HYDROCHLORIDE 25 MG/1
25 TABLET, FILM COATED ORAL DAILY
COMMUNITY

## 2024-08-16 NOTE — PATIENT INSTRUCTIONS
Start alendronate 70 mg orally once weekly. Take this 30 minutes before eating for the day. Drink 6-8 ounces of water with this. Please make sure you sit upright for 30 minutes after taking.  Start naltrexone 4.5 mg daily.  Recommend try Kailash Chi.  Continue low impact exercise such as treadmill.  Follow-up in 3 months.

## 2024-08-18 ASSESSMENT — ENCOUNTER SYMPTOMS
FATIGUE: 1
RESPIRATORY NEGATIVE: 1
GASTROINTESTINAL NEGATIVE: 1
CARDIOVASCULAR NEGATIVE: 1

## 2024-08-20 ENCOUNTER — NUTRITION (OUTPATIENT)
Dept: NUTRITION | Facility: CLINIC | Age: 69
End: 2024-08-20
Payer: MEDICARE

## 2024-08-20 VITALS — WEIGHT: 97 LBS | BODY MASS INDEX: 19.04 KG/M2 | HEIGHT: 60 IN

## 2024-08-20 DIAGNOSIS — E11.9 TYPE 2 DIABETES MELLITUS WITHOUT COMPLICATION, WITHOUT LONG-TERM CURRENT USE OF INSULIN (MULTI): ICD-10-CM

## 2024-08-20 PROCEDURE — 97802 MEDICAL NUTRITION INDIV IN: CPT | Performed by: DIETITIAN, REGISTERED

## 2024-08-20 NOTE — PROGRESS NOTES
Nutrition Assessment     Reason for Visit:  Janneth Celis is a 69 y.o. female who presents for nutrition counseling seeking to gain weight.  Pmhx includes type II DM.      Anthropometrics:  Wt Readings from Last 10 Encounters:   08/20/24 (!) 44 kg (97 lb)   08/15/24 (!) 44.6 kg (98 lb 6.4 oz)   08/14/24 (!) 44.5 kg (98 lb)   06/26/24 (!) 43.9 kg (96 lb 12.8 oz)   06/12/24 (!) 44.4 kg (97 lb 12.8 oz)   06/05/24 (!) 44.8 kg (98 lb 12.3 oz)   05/30/24 45.9 kg (101 lb 3.2 oz)   07/12/23 45.6 kg (100 lb 9.6 oz)   11/09/22 46.3 kg (102 lb)   10/25/22 46.3 kg (102 lb)     Labs: [id number] (5/7/24/)    Anthropometrics  Height: 152.4 cm (5')  Weight: (!) 44 kg (97 lb)  BMI (Calculated): 18.94  Weight Change  Significant Weight Loss: No     Body mass index is 18.94 kg/m².   Food And Nutrient Intake:  Food and Nutrient History  Food and Nutrient History: Pt presents for nutrition counseling in the context of type II DM and wanting to gain weight.  Pt is experiencing a lot of fatigue.  Pt states that her diabetes is well-controlled right now but her HgbA1C has risen in the past up to 13.  Pt states that she has always been thin and had difficutly gaining weight but she feels more frail these days and has been less active with the fatigue.  Her BMI indicates that she is underweight.  She was also recetly diagnosed with osteoporosis and has a low Vit D.  She is using a supplement as she does not drink milk.  Pt has good meal rouitnes and has been adding healthy fats.  She has tried protein drinks in the past but has not found one that she likes that is lower in sugar for her diabetes.  Suggested she try Fairlife supplement or make her own smoothies with high fat dairy and added protein.  Smaller, more frequent meals may also be helpful as she get full easily.  Food Intolerance: lactose intolerance- milk  GI Symptoms: reflux, early satiety     Food Intake  Meal 1: 9:00- steel cut oats, and berries; 1/2 and 1/2, decaf tea; ciabatta  bread with butter  Meal 2: 11:00- MOJO- 2 slices of avocado toast with greens and tomato and olive oil, 8 grapes;  Meal 3: 5:00- Chicken, sweet potato, peas adn salad with olive oil and vinegar; bread and butter  Snacks: trailmix, cheese and crackers, peanut butter with apple    Physical Activities:  Physical Activity  Frequency (times/week): 4 (times/week)  Duration (minutes/day): 20 minutes/day  Type of Physical Activity: machines, treadmill- 1 mile         Nutrition Focused Physical Exam:  Subcutaneous Fat Loss  Orbital Fat Pads: Defer  Buccal Fat Pads: Mild-Moderate (flat cheeks, minimal bounce)  Triceps: Defer  Ribs: Defer  Muscle Wasting  Temporalis: Mild-Moderate (slight depression)  Pectoralis (Clavicular Region): Mild-Moderate (some protrusion of clavicle)  Interosseous: Defer  Trapezius/Infraspinatus/Supraspinatus (Scapular Region): Mild-Moderate (slight protrusion of scapula)  Quadriceps: Defer  Gastrocnemius: Defer        Energy Needs  Calculated Energy Needs Using Equations  Height: 152.4 cm (5')  Weight Used for Equation Calculations: 44 kg (97 lb)  [place]- . Saad Equation (Overweight or Obese Patients): 881  Activity Factor: 1.3  Total Energy Needs: 1145  Estimated Energy Needs  Total Energy Estimated Needs (kCal): 1650 kCal  Method for Estimating Needs: +500 kcals/day  Estimated Protein Needs  Total Protein Estimated Needs (g): 35 g  Micronutrient Needs  Total Estimated Vitamin Needs: D  Total Estimated Mineral Needs: Calcium        Nutrition Diagnosis   Malnutrition Diagnosis  Patient has Malnutrition Diagnosis: Yes  Malnutrition Diagnosis: Mild malnutrition related to starvation  As Evidenced by: Loss of muscle per physial exam  Nutrition Diagnosis  Patient has Nutrition Diagnosis: Yes  Diagnosis Status (1): New  Nutrition Diagnosis 1: Underweight  Related to (1): unintended weight loss; inability to gain weight  As Evidenced by (1): pt interview; BMI; wt hx    Nutrition  "Interventions/Recommendations   Nutrition Prescription  Individualized Nutrition Prescription Provided for : Small, frequnet meals that include healthy fats and increased protein  Food and Nutrition Delivery  Meals & Snacks: Modify schedule of foods/fluids, General Healthful Diet, Carbohydrate-modified diet  Goals: 2987-6470 kcals/day; increased protein with meals~45g per day  Medical Food Supplement: Other, Specify:  Goals: Fairlife Shakes 1-2x/day  Vitamin Supplement Therapy: D  Goals: MVI  Mineral Supplement Therapy: Calcium        Patient Instructions   Focus on eating small, frequent meals or mini-meals 5-6x/day.  Include a protein food with each meal and snack such as full fat dairy, nut butters, cheese, meat, fish, nuts.    Follow the \"nothing plain rule\".  Dip crackers, chips, vegetables, and fruits in high calorie dips or nut butters.  Add cheese, whole milk, dips or dressings to foods when you can.    Consider making smoothies with whole milk or milk sub, yogurt, protein powders, frozen fruits.    Aim for at least 1745-5442 kcals per day- refer to sample meal plans provided and tips for adding calories and protein.    Consider a high protein supplement such as Fairlife 1-2x/day      Nutrition Monitoring and Evaluation   Food/Nutrient Related History Monitoring  Monitoring and Evaluation Plan: Energy intake, Meal/snack pattern, Protein intake, Carbohydrate intake  Energy Intake: Estimated energy intake  Criteria: 4903-3411 kcals/day; 45g of protein daily; added fats  Meal/Snack Pattern: Estimated meal and snack pattern  Criteria: 5-6 small meals per day  Estimated carbohydrate intake: Estimated carbohydrate intake  Criteria: Limit carbohydrates to 3-4 servings per meal~45-60g  Biochemical Data, Medical Tests and Procedures  Monitoring and Evaluation Plan: Electrolyte/renal panel, Glucose/endocrine profile  Electrolyte and Renal Panel: Sodium  Glucose/Endocrine Profile: Hemoglobin A1c (HgbA1c)  Criteria: " HgbA1C<7.0      Readiness to Change : Good  Level of Understanding : Excellent  Anticipated Compliant : Good

## 2024-08-20 NOTE — PATIENT INSTRUCTIONS
"Focus on eating small, frequent meals or mini-meals 5-6x/day.  Include a protein food with each meal and snack such as full fat dairy, nut butters, cheese, meat, fish, nuts.    Follow the \"nothing plain rule\".  Dip crackers, chips, vegetables, and fruits in high calorie dips or nut butters.  Add cheese, whole milk, dips or dressings to foods when you can.    Consider making smoothies with whole milk or milk sub, yogurt, protein powders, frozen fruits.    Aim for at least 8892-2500 kcals per day- refer to sample meal plans provided and tips for adding calories and protein.    Consider a high protein supplement such as Fairlife 1-2x/day    "

## 2024-08-23 ENCOUNTER — HOSPITAL ENCOUNTER (OUTPATIENT)
Dept: CARDIOLOGY | Facility: HOSPITAL | Age: 69
Discharge: HOME | End: 2024-08-23
Payer: MEDICARE

## 2024-08-23 DIAGNOSIS — I51.7 ATRIAL ENLARGEMENT, BILATERAL: ICD-10-CM

## 2024-08-23 DIAGNOSIS — R94.31 ABNORMAL EKG: ICD-10-CM

## 2024-08-23 PROCEDURE — 93306 TTE W/DOPPLER COMPLETE: CPT

## 2024-08-26 LAB
AORTIC VALVE PEAK VELOCITY: 1.23 M/S
AV PEAK GRADIENT: 6.1 MMHG
AVA (PEAK VEL): 2.5 CM2
EJECTION FRACTION APICAL 4 CHAMBER: 62.5
EJECTION FRACTION: 63 %
LEFT ATRIUM VOLUME AREA LENGTH INDEX BSA: 19 ML/M2
LEFT VENTRICLE INTERNAL DIMENSION DIASTOLE: 4.2 CM (ref 3.5–6)
LEFT VENTRICULAR OUTFLOW TRACT DIAMETER: 1.8 CM
LV EJECTION FRACTION BIPLANE: 55 %
MITRAL VALVE E/A RATIO: 1.16
RIGHT VENTRICLE FREE WALL PEAK S': 14 CM/S
RIGHT VENTRICLE PEAK SYSTOLIC PRESSURE: 20.3 MMHG
TRICUSPID ANNULAR PLANE SYSTOLIC EXCURSION: 2.3 CM

## 2024-08-27 ENCOUNTER — APPOINTMENT (OUTPATIENT)
Dept: CARDIOLOGY | Facility: HOSPITAL | Age: 69
End: 2024-08-27
Payer: MEDICARE

## 2024-09-02 NOTE — PROGRESS NOTES
ENT Outpatient Consultation    Chief Complaint: tinnitus  History Of Present Illness  Janneth Celis is a 69 y.o. female presents for tinnitus. She reports a history of longstanding non-pulsatile tinnitus since her 40s which at this time is not overly bothersome. A few weeks ago she developed a sinus infection which was successfully treated with antibiotics. Since then, she has noticed intermittent pulsatile tinnitus in the right ear which corresponds with her heartbeat. This is not present all the time and tends to get worse which she is anxious. No otorrhea, prior ear surgeries, or vertigo. Of note, she has a decayed right upper molar that is painful and she may require a root canal for. Also endorses significant TMJ although she doesn't wear a mouthguard.     Past Medical History  She has a past medical history of Allergic (Childhood - seasonal grass allergy), Anxiety (2000), Diabetes mellitus (Multi) (2018), Disease of thyroid gland (2011), Eczema (Childhood), Encounter for gynecological examination (general) (routine) without abnormal findings, Encounter for screening for cardiovascular disorders, Essential (primary) hypertension (01/07/2013), Glaucoma (2021), HL (hearing loss) (2022), Other conditions influencing health status, Other conditions influencing health status, Palpitations (12/14/2012), Personal history of diseases of the blood and blood-forming organs and certain disorders involving the immune mechanism, Personal history of gestational diabetes, Personal history of other diseases of the circulatory system, Personal history of other diseases of the musculoskeletal system and connective tissue, Personal history of other diseases of the nervous system and sense organs, Personal history of other diseases of the nervous system and sense organs, Personal history of other endocrine, nutritional and metabolic disease, Personal history of other infectious and parasitic diseases (01/22/2013), Personal history  of other mental and behavioral disorders, Unspecified open wound, unspecified foot, initial encounter (2013), Unspecified pre-eclampsia, unspecified trimester ([facility]), and Visual impairment (Nearsighted since childhood).    Surgical History  She has a past surgical history that includes  section, classic (2012); Other surgical history (2012); Dilation and curettage of uterus (2012); Other surgical history (2012);  section, low transverse (, ); and [place] tooth extraction ().     Social History  She reports that she has never smoked. She has never used smokeless tobacco. She reports that she does not drink alcohol and does not use drugs.    Family History  Family History   Problem Relation Name Age of Onset    Other (arthralgia) Mother Dot     Atrial fibrillation Mother Dot     Heart disease Mother Dot     Hypertension Mother Dot     Hypothyroidism Mother Dot     Heart attack Mother Dot     Mitral valve prolapse Mother Dot     Dementia Mother Dot         vascular    Arthritis Mother Dot     Hypertension Father Kurt     Hypothyroidism Father Kurt     Mitral valve prolapse Sister Amy     Hypertension Sister Amy     Hypertension Sister Anya         Allergies  Ragweed pollen, Feathers, Grass pollen, and Penicillins     Physical Exam:  CONSTITUTIONAL:  No acute distress, appears anxious  VOICE:  No hoarseness or other abnormality  RESPIRATION:  Breathing comfortably, no stridor  EYES:  EOM intact, sclera normal  NEURO:  Alert and oriented times 3, Cranial nerves II-XII grossly intact and symmetric bilaterally  HEAD AND FACE:  clicking of bilateral TMJ on mouth closing  EARS:  Normal external ears, external auditory canals, and TMs to otoscopy, normal hearing to whispered voice.  NOSE:  External nose midline, anterior rhinoscopy is normal with limited visualization to the anterior aspect of the interior turbinates, no bleeding or drainage, no lesions  ORAL  CAVITY/OROPHARYNX/LIPS:  Normal mucous membranes, normal floor of mouth/tongue/OP, no masses or lesions; painful upper right molar to palpation, no gross evidence of infection  PHARYNGEAL WALLS:  No masses or lesions  NECK/LYMPH:  No LAD, no thyroid masses, trachea midline  SKIN:  Neck skin is without scar or injury  PSYCH:  Alert and oriented with appropriate mood and affect    Procedure:  Otomicroscopy  Right: EAC smaller than normal caliber but otherwise normal. TM intact without effusion or retraction. Middle ear space clear.  Left: Cerumen removed with loop curette. EAC smaller than normal caliber. TM intact without effusion or retraction. Middle ear space clear.        Last Recorded Vitals  Blood pressure 117/70, pulse 88, height 1.524 m (5'), weight (!) 44 kg (97 lb), SpO2 96%.    Relevant Results  Audiogram 9/3/24:      My independent interpretation: bilateral, symmetric SNHL, tympanograms WNL      Assessment and Plan  69 y.o. female with   Problem List Items Addressed This Visit    None  Visit Diagnoses       Pulsatile tinnitus of right ear    -  Primary        Discussed that given her recent infection and current tooth infection which is being evaluated later this week, along with the short duration of pulsatile tinnitus, recommended the patient continue to monitor her symptoms and any associations that may be present. Reassurance provided today that her ear exam is overall normal without acute concern. If symptoms become persistent or worsening, will obtain further imaging to evaluate for causes of pulsatile tinnitus.  -RTC 3 months    Shaila Atkins MD

## 2024-09-03 ENCOUNTER — TELEPHONE (OUTPATIENT)
Dept: PRIMARY CARE | Facility: CLINIC | Age: 69
End: 2024-09-03

## 2024-09-03 ENCOUNTER — APPOINTMENT (OUTPATIENT)
Dept: OTOLARYNGOLOGY | Facility: CLINIC | Age: 69
End: 2024-09-03
Payer: MEDICARE

## 2024-09-03 ENCOUNTER — APPOINTMENT (OUTPATIENT)
Dept: CARDIOLOGY | Facility: HOSPITAL | Age: 69
End: 2024-09-03
Payer: MEDICARE

## 2024-09-03 ENCOUNTER — PATIENT MESSAGE (OUTPATIENT)
Dept: PRIMARY CARE | Facility: CLINIC | Age: 69
End: 2024-09-03

## 2024-09-03 VITALS
HEART RATE: 88 BPM | BODY MASS INDEX: 19.04 KG/M2 | WEIGHT: 97 LBS | OXYGEN SATURATION: 96 % | HEIGHT: 60 IN | SYSTOLIC BLOOD PRESSURE: 117 MMHG | DIASTOLIC BLOOD PRESSURE: 70 MMHG

## 2024-09-03 DIAGNOSIS — I10 BENIGN ESSENTIAL HYPERTENSION: Primary | ICD-10-CM

## 2024-09-03 DIAGNOSIS — F41.1 GENERALIZED ANXIETY DISORDER: ICD-10-CM

## 2024-09-03 DIAGNOSIS — H93.A1 PULSATILE TINNITUS OF RIGHT EAR: Primary | ICD-10-CM

## 2024-09-03 PROCEDURE — 3008F BODY MASS INDEX DOCD: CPT | Performed by: STUDENT IN AN ORGANIZED HEALTH CARE EDUCATION/TRAINING PROGRAM

## 2024-09-03 PROCEDURE — 1157F ADVNC CARE PLAN IN RCRD: CPT | Performed by: STUDENT IN AN ORGANIZED HEALTH CARE EDUCATION/TRAINING PROGRAM

## 2024-09-03 PROCEDURE — 3074F SYST BP LT 130 MM HG: CPT | Performed by: STUDENT IN AN ORGANIZED HEALTH CARE EDUCATION/TRAINING PROGRAM

## 2024-09-03 PROCEDURE — 1159F MED LIST DOCD IN RCRD: CPT | Performed by: STUDENT IN AN ORGANIZED HEALTH CARE EDUCATION/TRAINING PROGRAM

## 2024-09-03 PROCEDURE — 3062F POS MACROALBUMINURIA REV: CPT | Performed by: STUDENT IN AN ORGANIZED HEALTH CARE EDUCATION/TRAINING PROGRAM

## 2024-09-03 PROCEDURE — 1123F ACP DISCUSS/DSCN MKR DOCD: CPT | Performed by: STUDENT IN AN ORGANIZED HEALTH CARE EDUCATION/TRAINING PROGRAM

## 2024-09-03 PROCEDURE — 1160F RVW MEDS BY RX/DR IN RCRD: CPT | Performed by: STUDENT IN AN ORGANIZED HEALTH CARE EDUCATION/TRAINING PROGRAM

## 2024-09-03 PROCEDURE — 3044F HG A1C LEVEL LT 7.0%: CPT | Performed by: STUDENT IN AN ORGANIZED HEALTH CARE EDUCATION/TRAINING PROGRAM

## 2024-09-03 PROCEDURE — 3048F LDL-C <100 MG/DL: CPT | Performed by: STUDENT IN AN ORGANIZED HEALTH CARE EDUCATION/TRAINING PROGRAM

## 2024-09-03 PROCEDURE — 99203 OFFICE O/P NEW LOW 30 MIN: CPT | Performed by: STUDENT IN AN ORGANIZED HEALTH CARE EDUCATION/TRAINING PROGRAM

## 2024-09-03 PROCEDURE — 3078F DIAST BP <80 MM HG: CPT | Performed by: STUDENT IN AN ORGANIZED HEALTH CARE EDUCATION/TRAINING PROGRAM

## 2024-09-03 PROCEDURE — 1036F TOBACCO NON-USER: CPT | Performed by: STUDENT IN AN ORGANIZED HEALTH CARE EDUCATION/TRAINING PROGRAM

## 2024-09-03 NOTE — TELEPHONE ENCOUNTER
I called Joe patient's  , he has few concerns that patient did not schedule her mammogram, she rescheduled Holter monitor because she had was having dental work, please still feel overwhelmed due to her chronic medical condition and seen multiple specialist.  I did recommend that we can offer her to other services/population health, I will send a message to patient through [employer] and see if she agreed to be referred to [facility] care management team for follow-up.

## 2024-09-03 NOTE — TELEPHONE ENCOUNTER
Joe would like to meet with Dr. Garduno about some issues with his wife for 5 mins.    Please call Joe at  [phone]

## 2024-09-04 ENCOUNTER — APPOINTMENT (OUTPATIENT)
Dept: CARDIOLOGY | Facility: HOSPITAL | Age: 69
End: 2024-09-04
Payer: MEDICARE

## 2024-09-05 ENCOUNTER — LAB (OUTPATIENT)
Dept: LAB | Facility: LAB | Age: 69
End: 2024-09-05
Payer: MEDICARE

## 2024-09-05 DIAGNOSIS — M19.90 ARTHRITIS: ICD-10-CM

## 2024-09-05 DIAGNOSIS — E87.1 HYPONATREMIA: ICD-10-CM

## 2024-09-05 DIAGNOSIS — R53.83 OTHER FATIGUE: ICD-10-CM

## 2024-09-05 DIAGNOSIS — E03.9 HYPOTHYROIDISM, UNSPECIFIED: ICD-10-CM

## 2024-09-05 DIAGNOSIS — E11.9 TYPE 2 DIABETES MELLITUS WITHOUT COMPLICATIONS (MULTI): Primary | ICD-10-CM

## 2024-09-05 DIAGNOSIS — E55.9 VITAMIN D DEFICIENCY: ICD-10-CM

## 2024-09-05 DIAGNOSIS — I10 PRIMARY HYPERTENSION: ICD-10-CM

## 2024-09-05 LAB
25(OH)D3 SERPL-MCNC: 29 NG/ML (ref 30–100)
ALBUMIN SERPL BCP-MCNC: 4.4 G/DL (ref 3.4–5)
ALP SERPL-CCNC: 64 U/L (ref 33–136)
ALT SERPL W P-5'-P-CCNC: 7 U/L (ref 7–45)
ANION GAP SERPL CALC-SCNC: 10 MMOL/L (ref 10–20)
AST SERPL W P-5'-P-CCNC: 15 U/L (ref 9–39)
BASOPHILS # BLD AUTO: 0.04 X10*3/UL (ref 0–0.1)
BASOPHILS NFR BLD AUTO: 0.8 %
BILIRUB SERPL-MCNC: 1 MG/DL (ref 0–1.2)
BUN SERPL-MCNC: 14 MG/DL (ref 6–23)
CALCIUM SERPL-MCNC: 9.7 MG/DL (ref 8.6–10.3)
CCP IGG SERPL-ACNC: 8 U/ML
CHLORIDE SERPL-SCNC: 94 MMOL/L (ref 98–107)
CO2 SERPL-SCNC: 30 MMOL/L (ref 21–32)
CREAT SERPL-MCNC: 0.79 MG/DL (ref 0.5–1.05)
EGFRCR SERPLBLD CKD-EPI 2021: 81 ML/MIN/1.73M*2
EOSINOPHIL # BLD AUTO: 0.11 X10*3/UL (ref 0–0.7)
EOSINOPHIL NFR BLD AUTO: 2.1 %
ERYTHROCYTE [DISTWIDTH] IN BLOOD BY AUTOMATED COUNT: 11.9 % (ref 11.5–14.5)
ERYTHROCYTE [SEDIMENTATION RATE] IN BLOOD BY WESTERGREN METHOD: 2 MM/H (ref 0–30)
EST. AVERAGE GLUCOSE BLD GHB EST-MCNC: 128 MG/DL
GLUCOSE SERPL-MCNC: 105 MG/DL (ref 74–99)
HBA1C MFR BLD: 6.1 %
HCT VFR BLD AUTO: 39.4 % (ref 36–46)
HGB BLD-MCNC: 13 G/DL (ref 12–16)
IMM GRANULOCYTES # BLD AUTO: 0.01 X10*3/UL (ref 0–0.7)
IMM GRANULOCYTES NFR BLD AUTO: 0.2 % (ref 0–0.9)
LYMPHOCYTES # BLD AUTO: 1.35 X10*3/UL (ref 1.2–4.8)
LYMPHOCYTES NFR BLD AUTO: 26 %
MCH RBC QN AUTO: 30.3 PG (ref 26–34)
MCHC RBC AUTO-ENTMCNC: 33 G/DL (ref 32–36)
MCV RBC AUTO: 92 FL (ref 80–100)
MONOCYTES # BLD AUTO: 0.52 X10*3/UL (ref 0.1–1)
MONOCYTES NFR BLD AUTO: 10 %
NEUTROPHILS # BLD AUTO: 3.16 X10*3/UL (ref 1.2–7.7)
NEUTROPHILS NFR BLD AUTO: 60.9 %
NRBC BLD-RTO: 0 /100 WBCS (ref 0–0)
PLATELET # BLD AUTO: 310 X10*3/UL (ref 150–450)
POTASSIUM SERPL-SCNC: 4.1 MMOL/L (ref 3.5–5.3)
PROT SERPL-MCNC: 7.4 G/DL (ref 6.4–8.2)
RBC # BLD AUTO: 4.29 X10*6/UL (ref 4–5.2)
RHEUMATOID FACT SER NEPH-ACNC: <10 IU/ML (ref 0–15)
SODIUM SERPL-SCNC: 130 MMOL/L (ref 136–145)
T3FREE SERPL-MCNC: 2.7 PG/ML (ref 2.3–4.2)
T4 FREE SERPL-MCNC: 1.04 NG/DL (ref 0.61–1.12)
TSH SERPL-ACNC: 2.44 MIU/L (ref 0.44–3.98)
VIT B12 SERPL-MCNC: 300 PG/ML (ref 211–911)
WBC # BLD AUTO: 5.2 X10*3/UL (ref 4.4–11.3)

## 2024-09-05 PROCEDURE — 84443 ASSAY THYROID STIM HORMONE: CPT

## 2024-09-05 PROCEDURE — 86225 DNA ANTIBODY NATIVE: CPT

## 2024-09-05 PROCEDURE — 86038 ANTINUCLEAR ANTIBODIES: CPT

## 2024-09-05 PROCEDURE — 80053 COMPREHEN METABOLIC PANEL: CPT

## 2024-09-05 PROCEDURE — 85652 RBC SED RATE AUTOMATED: CPT

## 2024-09-05 PROCEDURE — 83036 HEMOGLOBIN GLYCOSYLATED A1C: CPT

## 2024-09-05 PROCEDURE — 86431 RHEUMATOID FACTOR QUANT: CPT

## 2024-09-05 PROCEDURE — 85025 COMPLETE CBC W/AUTO DIFF WBC: CPT

## 2024-09-05 PROCEDURE — 36415 COLL VENOUS BLD VENIPUNCTURE: CPT

## 2024-09-05 PROCEDURE — 86200 CCP ANTIBODY: CPT

## 2024-09-05 PROCEDURE — 82607 VITAMIN B-12: CPT

## 2024-09-05 PROCEDURE — 84481 FREE ASSAY (FT-3): CPT

## 2024-09-05 PROCEDURE — 84439 ASSAY OF FREE THYROXINE: CPT

## 2024-09-05 PROCEDURE — 86235 NUCLEAR ANTIGEN ANTIBODY: CPT

## 2024-09-05 PROCEDURE — 82306 VITAMIN D 25 HYDROXY: CPT

## 2024-09-06 ENCOUNTER — TELEPHONE (OUTPATIENT)
Dept: PRIMARY CARE | Facility: CLINIC | Age: 69
End: 2024-09-06
Payer: MEDICARE

## 2024-09-06 LAB
ANA PATTERN: ABNORMAL
ANA SER QL HEP2 SUBST: POSITIVE
ANA TITR SER IF: ABNORMAL {TITER}
CENTROMERE B AB SER-ACNC: <0.2 AI
CHROMATIN AB SERPL-ACNC: <0.2 AI
DSDNA AB SER-ACNC: 3 IU/ML
ENA JO1 AB SER QL IA: <0.2 AI
ENA RNP AB SER IA-ACNC: <0.2 AI
ENA SCL70 AB SER QL IA: <0.2 AI
ENA SM AB SER IA-ACNC: <0.2 AI
ENA SM+RNP AB SER QL IA: <0.2 AI
ENA SS-A AB SER IA-ACNC: <0.2 AI
ENA SS-B AB SER IA-ACNC: <0.2 AI
RIBOSOMAL P AB SER-ACNC: <0.2 AI

## 2024-09-06 NOTE — TELEPHONE ENCOUNTER
Patient's  advised that patient received results through [facility] and the   Citrulline antibody  IgG level was at 8 . Patient asking for a call back or [employer] message regarding result   Janneth [phone]

## 2024-09-08 DIAGNOSIS — I10 PRIMARY HYPERTENSION: ICD-10-CM

## 2024-09-09 ENCOUNTER — DOCUMENTATION (OUTPATIENT)
Dept: PRIMARY CARE | Facility: CLINIC | Age: 69
End: 2024-09-09
Payer: MEDICARE

## 2024-09-09 RX ORDER — METOPROLOL TARTRATE 25 MG/1
25 TABLET, FILM COATED ORAL DAILY
Qty: 90 TABLET | Refills: 3 | Status: SHIPPED | OUTPATIENT
Start: 2024-09-09

## 2024-09-10 ENCOUNTER — APPOINTMENT (OUTPATIENT)
Dept: SLEEP MEDICINE | Facility: CLINIC | Age: 69
End: 2024-09-10
Payer: MEDICARE

## 2024-09-11 ENCOUNTER — PATIENT OUTREACH (OUTPATIENT)
Dept: PRIMARY CARE | Facility: CLINIC | Age: 69
End: 2024-09-11
Payer: MEDICARE

## 2024-09-11 DIAGNOSIS — I10 HTN (HYPERTENSION), BENIGN: ICD-10-CM

## 2024-09-11 DIAGNOSIS — E11.9 TYPE 2 DIABETES MELLITUS WITHOUT COMPLICATION, WITHOUT LONG-TERM CURRENT USE OF INSULIN (MULTI): ICD-10-CM

## 2024-09-11 NOTE — PROGRESS NOTES
Pt agreed to [facility] services and very appreciative of the call. Stated that she's very independent and walks with no device. Does not have any pain at this time. Lives at home with . She takes her BS and BP several x's a week. Stated that she really needs to gain weight and agreed to speak to dietician regarding healthy food choices. Request sent. Also would like to pharmacy regarding meds and request sent. Has several follow ups with specialists that have been scheduled. Stated she has no problem with transportation. Advised to call this nurse with any questions or concerns.

## 2024-09-19 ENCOUNTER — APPOINTMENT (OUTPATIENT)
Dept: CARDIOLOGY | Facility: CLINIC | Age: 69
End: 2024-09-19
Payer: MEDICARE

## 2024-09-20 ENCOUNTER — PATIENT OUTREACH (OUTPATIENT)
Dept: CARE COORDINATION | Facility: CLINIC | Age: 69
End: 2024-09-20
Payer: MEDICARE

## 2024-09-20 NOTE — PROGRESS NOTES
Pt ref to this RD by [facility] RN for healthy weight gain. This RD attempted to call pt today without success. LVM with this RD's info. Pt is encouraged to call this RD back at [phone]. Will attempt to reach pt again at later date if no return call

## 2024-09-24 ENCOUNTER — PATIENT OUTREACH (OUTPATIENT)
Dept: PHARMACY | Facility: HOSPITAL | Age: 69
End: 2024-09-24
Payer: MEDICARE

## 2024-09-24 RX ORDER — VIT C/E/ZN/COPPR/LUTEIN/ZEAXAN 250MG-90MG
25 CAPSULE ORAL
COMMUNITY

## 2024-09-24 SDOH — HEALTH STABILITY: PHYSICAL HEALTH: EXERCISE LEVEL: MODERATE

## 2024-09-24 SDOH — HEALTH STABILITY: MENTAL HEALTH: CAFFEINE CONSUMPTION: DAILY

## 2024-09-24 NOTE — PROGRESS NOTES
Transitional Care Management: Pharmacy    Subjective   Janneth Celis is a 69 y.o. female who was referred to Clinical Pharmacy Team to complete TCM medication reconciliation prior to office visit with Elisabeth Garduno MD on 9/24/24. A comprehensive medication review was completed with the patient. patient had all medications and/or an updated medication list in front of them during the telephone encounter.     Medication Reconciliation  General Medication Management    Type of medication management: comprehensive medication review  Referred by: case management  Targeting Criteria Met: multiple chronic diseases/multiple Part D drugs/cost threshold  Is the Beneficiary in a Long Term Care Facility at the Time of the First CMR Offer?: no  Cognitive ability: good  Cognitive impairment status verified this year: no  Recipient: beneficiary  Provider: hospital pharmacist  Visit type: initial  Method of contact: by telephone  Medications at visit: does not bring in medications  Caffeine use: daily  Physical activity level: moderate  Diet: excellent   Diet comment: Plans to meet with nutritionist to help with weight gain          Medication Adherence    What concerns does the patient have in regards to their medications: Recently diagnosed with Osteoporosis, was instructed to take 3000u of Vitamin D3 and 1200mg of Calcium. Previously was taking Citracal minis, but does not give enough of D3 or Calcium. Changed to more potent formulation, however wants to know when to take medication due to Synthroid and Fosamax on regimen.  Patient reported X missed doses in the last 7 days: 0  Any gaps in refill history greater than 2 weeks in the last 3 months: no  Demonstrates understanding of importance of adherence: yes  Informant: patient  Reliability of informant: reliable  Estimated medication adherence level: %  Adherence estimation source: claims history  Reasons for non-adherence: adverse effects  Adherence tools used:  medication list, directed education  Support network for adherence: family member, healthcare provider     No issues reported in regards to accessibility affordability adherence organization.    Allergies/Intolerances:   Allergies   Allergen Reactions    Ragweed Pollen Runny nose    Feathers Runny nose    Grass Pollen Runny nose    Penicillins Rash     Current Outpatient Medications   Medication Instructions    alendronate (FOSAMAX) 70 mg, oral, Every 7 days, Take in the morning with a full glass of water, on an empty stomach, and do not take anything else by mouth or lie down for the next 30 min.    bimatoprost (Lumigan) 0.01 % ophthalmic solution 1 drop, Both Eyes, Nightly    calcium/D3/zinc/copper/dallas (CITRACAL-D3 MAXIMUM PLUS ORAL) 2 tablets, oral, Daily with lunch    cholecalciferol (VITAMIN D3) 25 mcg, oral, Daily with lunch    clonazePAM (KlonoPIN) 1 mg tablet 1.5 tablets, oral, Nightly    fluticasone (Flonase) 50 mcg/actuation nasal spray 2 sprays, Each Nostril, Daily PRN    levothyroxine (SYNTHROID, LEVOXYL) 25 mcg, oral, Daily before breakfast, SYNTHROID ONLY.     metFORMIN (GLUCOPHAGE) 1,000 mg, oral, Daily    metoprolol succinate XL (TOPROL-XL) 25 mg, oral, Daily, Do not crush or chew.    Naltrex 4.5 mg, oral, Daily    pantoprazole (PROTONIX) 20 mg, oral, As needed    valACYclovir (Valtrex) 1 gram tablet Take 2 tablets twice a day for 1 day for flare ups     Added:  Vitamin D3 1000u every day  Citracal Plus Max 2 caplets daily  Removed:  Metoprolol Tartrate  Singulair  Sertraline    Objective     Lab  Wt Readings from Last 3 Encounters:   09/03/24 (!) 44 kg (97 lb)   08/20/24 (!) 44 kg (97 lb)   08/15/24 (!) 44.6 kg (98 lb 6.4 oz)     Pulse Readings from Last 3 Encounters:   09/03/24 88   08/15/24 100   08/14/24 81     BP Readings from Last 3 Encounters:   09/03/24 117/70   08/15/24 153/88   08/14/24 132/82      Lab Results   Component Value Date    BILITOT 1.0 09/05/2024    CALCIUM 9.7 09/05/2024     CO2 30 09/05/2024    CL 94 (L) 09/05/2024    CREATININE 0.79 09/05/2024    CREATININE 0.70 06/10/2024    CREATININE 0.80 04/18/2023    GLUCOSE 105 (H) 09/05/2024    ALKPHOS 64 09/05/2024    K 4.1 09/05/2024    PROT 7.4 09/05/2024     (L) 09/05/2024    AST 15 09/05/2024    ALT 7 09/05/2024    BUN 14 09/05/2024    ANIONGAP 10 09/05/2024    PHOS 3.9 04/18/2023    ALBUMIN 4.4 09/05/2024    GFRF 80 04/18/2023    GFRF 55 (A) 04/29/2022     Lab Results   Component Value Date    TRIG 49 05/09/2024    TRIG 84 04/29/2022    TRIG 49 10/15/2019    CHOL 139 05/09/2024    CHOL 139 04/29/2022    CHOL 142 10/15/2019    LDLCALC 53 05/09/2024    HDL 76.3 05/09/2024    HDL 75.0 04/29/2022    HDL 73.2 10/15/2019     Lab Results   Component Value Date    HGBA1C 6.1 (H) 09/05/2024    HGBA1C 6.0 (H) 05/07/2024    HGBA1C 6.0 (A) 04/18/2023     Drug Interactions:  Synthroid, Fosamax, Calcium, Vitamin D3 - advised to separate vitamins from Synthroid by 4-6 hours; Fosamax also by 4-6 hours    Assessment/Plan    Indication, effectiveness, safety and convenience of her medications were reviewed today. The patient's medical conditions were assessed, evaluated, and deemed meeting goals of drug therapy, with the following exceptions.      Completed Medication Therapy Recommendations  History of hypothyroidism    Current Medication: levothyroxine (Synthroid, Levoxyl) 25 mcg tablet   Rationale: Medication interaction   Recommendation: Change Administration Time   Note: Seperate administration of Levothyroxine by at least 4-6 hours from Vitamin D3 & Calcium - patient instructed to take Vitamin D3 and Calcium around lunchtime each day and at dinner time, Levothyroxine to be taken in AM           Comments/Recommendations to PCP:  Instructed to only take 2 caplets daily of Citracal Max Plus - supplements remaining calcium from Boost Plus (Contains 330 mg of Calcium) & diet  Instructed to take 1 tablet of 1000u (25 mcg) Vitamin D3 at dinner for  daily total of 2000u daily (Citracal Max Plus contains 1000u per 2 caplet serving)  Advised to separate vitamins from Levothyroxine doses by 4-6 hours & Fosamax doses by 4-6 hours, vitamins to be taken with lunch & dinner    Paulina Arlelano PharmD    Verbal consent to manage patient's drug therapy was obtained from the patient and/or an individual authorized to act on behalf of a patient. They were informed they may decline to participate or withdraw from participation in pharmacy services at any time.

## 2024-09-25 ENCOUNTER — PATIENT OUTREACH (OUTPATIENT)
Dept: CARE COORDINATION | Facility: CLINIC | Age: 69
End: 2024-09-25
Payer: MEDICARE

## 2024-09-25 NOTE — PROGRESS NOTES
Spoke to pt today, agreed to work with this RD for healthy weight gain. Scheduled initial appt which pt prefers to be telephonic only for 10/15 @2p

## 2024-09-30 ENCOUNTER — LAB (OUTPATIENT)
Dept: LAB | Facility: LAB | Age: 69
End: 2024-09-30
Payer: MEDICARE

## 2024-09-30 DIAGNOSIS — F41.1 GENERALIZED ANXIETY DISORDER: Primary | ICD-10-CM

## 2024-09-30 DIAGNOSIS — Z79.899 OTHER LONG TERM (CURRENT) DRUG THERAPY: ICD-10-CM

## 2024-09-30 LAB
ANION GAP SERPL CALC-SCNC: 11 MMOL/L (ref 10–20)
BUN SERPL-MCNC: 16 MG/DL (ref 6–23)
CALCIUM SERPL-MCNC: 9.6 MG/DL (ref 8.6–10.3)
CHLORIDE SERPL-SCNC: 96 MMOL/L (ref 98–107)
CO2 SERPL-SCNC: 28 MMOL/L (ref 21–32)
CREAT SERPL-MCNC: 0.72 MG/DL (ref 0.5–1.05)
EGFRCR SERPLBLD CKD-EPI 2021: 90 ML/MIN/1.73M*2
GLUCOSE SERPL-MCNC: 91 MG/DL (ref 74–99)
POTASSIUM SERPL-SCNC: 3.7 MMOL/L (ref 3.5–5.3)
SODIUM SERPL-SCNC: 131 MMOL/L (ref 136–145)

## 2024-09-30 PROCEDURE — 36415 COLL VENOUS BLD VENIPUNCTURE: CPT

## 2024-09-30 PROCEDURE — 80048 BASIC METABOLIC PNL TOTAL CA: CPT

## 2024-10-04 ENCOUNTER — PATIENT OUTREACH (OUTPATIENT)
Dept: PRIMARY CARE | Facility: CLINIC | Age: 69
End: 2024-10-04
Payer: MEDICARE

## 2024-10-04 DIAGNOSIS — I10 HTN (HYPERTENSION), BENIGN: ICD-10-CM

## 2024-10-04 DIAGNOSIS — E11.9 TYPE 2 DIABETES MELLITUS WITHOUT COMPLICATION, WITHOUT LONG-TERM CURRENT USE OF INSULIN (MULTI): ICD-10-CM

## 2024-10-04 NOTE — PROGRESS NOTES
Pt is doing well, but is getting a lot of dental work done and is focused on that right now. Confirmed that both dietician and pharmacist followed up with her. Has no concerns at this time. No med changes noted. Advised to call this nurse with any other questions.

## 2024-10-07 ENCOUNTER — APPOINTMENT (OUTPATIENT)
Dept: RADIOLOGY | Facility: HOSPITAL | Age: 69
End: 2024-10-07
Payer: MEDICARE

## 2024-10-10 ENCOUNTER — APPOINTMENT (OUTPATIENT)
Dept: PRIMARY CARE | Facility: CLINIC | Age: 69
End: 2024-10-10
Payer: MEDICARE

## 2024-10-15 ENCOUNTER — APPOINTMENT (OUTPATIENT)
Dept: CARE COORDINATION | Facility: CLINIC | Age: 69
End: 2024-10-15
Payer: MEDICARE

## 2024-11-12 ENCOUNTER — TELEPHONE (OUTPATIENT)
Dept: PRIMARY CARE | Facility: CLINIC | Age: 69
End: 2024-11-12

## 2024-11-12 ENCOUNTER — TELEMEDICINE (OUTPATIENT)
Dept: PRIMARY CARE | Facility: CLINIC | Age: 69
End: 2024-11-12
Payer: MEDICARE

## 2024-11-12 VITALS
HEART RATE: 75 BPM | WEIGHT: 94 LBS | SYSTOLIC BLOOD PRESSURE: 112 MMHG | OXYGEN SATURATION: 98 % | DIASTOLIC BLOOD PRESSURE: 74 MMHG | BODY MASS INDEX: 18.36 KG/M2

## 2024-11-12 DIAGNOSIS — U07.1 RESPIRATORY TRACT INFECTION DUE TO COVID-19 VIRUS: ICD-10-CM

## 2024-11-12 DIAGNOSIS — E11.9 CONTROLLED TYPE 2 DIABETES MELLITUS WITHOUT COMPLICATION, WITHOUT LONG-TERM CURRENT USE OF INSULIN (MULTI): ICD-10-CM

## 2024-11-12 DIAGNOSIS — I10 BENIGN ESSENTIAL HYPERTENSION: ICD-10-CM

## 2024-11-12 DIAGNOSIS — J98.8 RESPIRATORY TRACT INFECTION DUE TO COVID-19 VIRUS: Primary | ICD-10-CM

## 2024-11-12 DIAGNOSIS — U07.1 RESPIRATORY TRACT INFECTION DUE TO COVID-19 VIRUS: Primary | ICD-10-CM

## 2024-11-12 DIAGNOSIS — J98.8 RESPIRATORY TRACT INFECTION DUE TO COVID-19 VIRUS: ICD-10-CM

## 2024-11-12 PROCEDURE — 3048F LDL-C <100 MG/DL: CPT | Performed by: INTERNAL MEDICINE

## 2024-11-12 PROCEDURE — 3044F HG A1C LEVEL LT 7.0%: CPT | Performed by: INTERNAL MEDICINE

## 2024-11-12 PROCEDURE — 1160F RVW MEDS BY RX/DR IN RCRD: CPT | Performed by: INTERNAL MEDICINE

## 2024-11-12 PROCEDURE — 3074F SYST BP LT 130 MM HG: CPT | Performed by: INTERNAL MEDICINE

## 2024-11-12 PROCEDURE — 1159F MED LIST DOCD IN RCRD: CPT | Performed by: INTERNAL MEDICINE

## 2024-11-12 PROCEDURE — 1157F ADVNC CARE PLAN IN RCRD: CPT | Performed by: INTERNAL MEDICINE

## 2024-11-12 PROCEDURE — 1123F ACP DISCUSS/DSCN MKR DOCD: CPT | Performed by: INTERNAL MEDICINE

## 2024-11-12 PROCEDURE — 99213 OFFICE O/P EST LOW 20 MIN: CPT | Performed by: INTERNAL MEDICINE

## 2024-11-12 PROCEDURE — G2211 COMPLEX E/M VISIT ADD ON: HCPCS | Performed by: INTERNAL MEDICINE

## 2024-11-12 PROCEDURE — 3062F POS MACROALBUMINURIA REV: CPT | Performed by: INTERNAL MEDICINE

## 2024-11-12 PROCEDURE — 1036F TOBACCO NON-USER: CPT | Performed by: INTERNAL MEDICINE

## 2024-11-12 PROCEDURE — 3078F DIAST BP <80 MM HG: CPT | Performed by: INTERNAL MEDICINE

## 2024-11-12 RX ORDER — ESCITALOPRAM OXALATE 5 MG/1
1 TABLET ORAL
COMMUNITY
Start: 2024-10-17

## 2024-11-12 RX ORDER — CLONAZEPAM 0.5 MG/1
TABLET ORAL
COMMUNITY
Start: 2024-10-22

## 2024-11-12 RX ORDER — MIRTAZAPINE 7.5 MG/1
7.5 TABLET, FILM COATED ORAL NIGHTLY
COMMUNITY
Start: 2024-09-25

## 2024-11-12 ASSESSMENT — ENCOUNTER SYMPTOMS
FATIGUE: 1
SORE THROAT: 1
CHEST TIGHTNESS: 0
WHEEZING: 0
HEADACHES: 1
GASTROINTESTINAL NEGATIVE: 1
FEVER: 0
COUGH: 0

## 2024-11-12 NOTE — PROGRESS NOTES
Subjective   Patient ID: Janneth Celis is a 69 y.o. female who presents for COVID positive  (Patient is here for a virtual visit. Patient tested tested positive for COVID this morning. Patient was exposed from . Patient complains of a sore/scratchy throat, a slight headache, fatigue, and congestion. ).    An interactive audio and video telecommunication system with patient real time communications between the patient (at the original site) and provider (at the distant site) was utilized to provide this telehealth service.  Verbal consent was requested and obtained from the patient at this date for a telehealth.  Pt seen because she started last night with sore throat,headache,congestion,she tested positive for covid today,she recently returned from a trip to [facility] and her  tested positive for covid on 11/6,no SOB,wheezing.  Her home BP,accucheck and pulse has been normal.  She had dental work and lost few pounds.       Review of Systems   Constitutional:  Positive for fatigue. Negative for fever.   HENT:  Positive for congestion and sore throat.    Respiratory:  Negative for cough, chest tightness and wheezing.    Gastrointestinal: Negative.    Neurological:  Positive for headaches.       Objective   /74   Pulse 75   Wt (!) 42.6 kg (94 lb) Comment: 94 pounds  SpO2 98%   BMI 18.36 kg/m²     Physical Exam  Constitutional:       General: She is not in acute distress.  Pulmonary:      Effort: No respiratory distress.   Neurological:      Mental Status: She is alert.       Assessment/Plan   Problem List Items Addressed This Visit             ICD-10-CM    Benign essential hypertension I10     Stable,Continue same meds.           Respiratory tract infection due to COVID-19 virus - Primary U07.1, J98.8     Rest,push fluid,monitor pulse ox closely,go to ER if below 90 % or if short of breath.  Follow CDC recommendation regarding guidelines isolation and recommendation.  Call if not better.  Add Flonase  "nasal spray for nasal congestion as needed.  Can Tylenol for pain and fever and Mucinex DM for cough as needed.  Pt to monitor her symptoms,if it became worse,she will start Legevrio within 5 days of her symptoms,Rx sent,\"just in case she needs it\"         Relevant Medications    molnupiravir (Lagevrio) capsule capsule    Controlled type 2 diabetes mellitus without complication, without long-term current use of insulin (Multi) E11.9          "

## 2024-11-12 NOTE — ASSESSMENT & PLAN NOTE
"Rest,push fluid,monitor pulse ox closely,go to ER if below 90 % or if short of breath.  Follow CDC recommendation regarding guidelines isolation and recommendation.  Call if not better.  Add Flonase nasal spray for nasal congestion as needed.  Can Tylenol for pain and fever and Mucinex DM for cough as needed.  Pt to monitor her symptoms,if it became worse,she will start Legevrio within 5 days of her symptoms,Rx sent,\"just in case she needs it\"  "

## 2024-11-13 RX ORDER — MOLNUPIRAVIR 200 MG/1
800 CAPSULE ORAL EVERY 12 HOURS
Qty: 40 CAPSULE | Refills: 0 | Status: SHIPPED | OUTPATIENT
Start: 2024-11-13 | End: 2024-11-18

## 2024-11-14 ENCOUNTER — PATIENT OUTREACH (OUTPATIENT)
Dept: PRIMARY CARE | Facility: CLINIC | Age: 69
End: 2024-11-14
Payer: MEDICARE

## 2024-11-14 DIAGNOSIS — E11.9 TYPE 2 DIABETES MELLITUS WITHOUT COMPLICATION, WITHOUT LONG-TERM CURRENT USE OF INSULIN (MULTI): ICD-10-CM

## 2024-11-14 DIAGNOSIS — I10 HTN (HYPERTENSION), BENIGN: ICD-10-CM

## 2024-11-14 NOTE — PROGRESS NOTES
Pt is doing well. Recently treated for COVID. Had a lot of cold symptoms, mainly sore throat. Has remained afebrile. Told this nurse she was [place] of the country with  and knows that's where she picked up COVID. Pt states she is treating the symptoms. Has not needed Legevrio. Continues to monitor BS's and BP's.  Ambulating well with no device. No other concerns voiced at this time.

## 2024-11-18 ENCOUNTER — APPOINTMENT (OUTPATIENT)
Dept: SLEEP MEDICINE | Facility: CLINIC | Age: 69
End: 2024-11-18
Payer: MEDICARE

## 2024-11-22 ENCOUNTER — APPOINTMENT (OUTPATIENT)
Dept: RHEUMATOLOGY | Facility: CLINIC | Age: 69
End: 2024-11-22
Payer: MEDICARE

## 2024-11-25 ENCOUNTER — OFFICE VISIT (OUTPATIENT)
Dept: URGENT CARE | Age: 69
End: 2024-11-25
Payer: MEDICARE

## 2024-11-25 ENCOUNTER — CLINICAL SUPPORT (OUTPATIENT)
Dept: URGENT CARE | Age: 69
End: 2024-11-25
Payer: MEDICARE

## 2024-11-25 VITALS
OXYGEN SATURATION: 98 % | RESPIRATION RATE: 16 BRPM | DIASTOLIC BLOOD PRESSURE: 85 MMHG | WEIGHT: 94 LBS | SYSTOLIC BLOOD PRESSURE: 128 MMHG | HEART RATE: 78 BPM | HEIGHT: 60 IN | TEMPERATURE: 97.9 F | BODY MASS INDEX: 18.46 KG/M2

## 2024-11-25 DIAGNOSIS — J02.9 SORE THROAT: Primary | ICD-10-CM

## 2024-11-25 LAB — POC RAPID STREP: NEGATIVE

## 2024-11-25 PROCEDURE — 3008F BODY MASS INDEX DOCD: CPT | Performed by: FAMILY MEDICINE

## 2024-11-25 PROCEDURE — 3048F LDL-C <100 MG/DL: CPT | Performed by: FAMILY MEDICINE

## 2024-11-25 PROCEDURE — 3044F HG A1C LEVEL LT 7.0%: CPT | Performed by: FAMILY MEDICINE

## 2024-11-25 PROCEDURE — 87880 STREP A ASSAY W/OPTIC: CPT | Performed by: FAMILY MEDICINE

## 2024-11-25 PROCEDURE — 1125F AMNT PAIN NOTED PAIN PRSNT: CPT | Performed by: FAMILY MEDICINE

## 2024-11-25 PROCEDURE — 3062F POS MACROALBUMINURIA REV: CPT | Performed by: FAMILY MEDICINE

## 2024-11-25 PROCEDURE — 1123F ACP DISCUSS/DSCN MKR DOCD: CPT | Performed by: FAMILY MEDICINE

## 2024-11-25 PROCEDURE — 3079F DIAST BP 80-89 MM HG: CPT | Performed by: FAMILY MEDICINE

## 2024-11-25 PROCEDURE — 99213 OFFICE O/P EST LOW 20 MIN: CPT | Performed by: FAMILY MEDICINE

## 2024-11-25 PROCEDURE — 1157F ADVNC CARE PLAN IN RCRD: CPT | Performed by: FAMILY MEDICINE

## 2024-11-25 PROCEDURE — 3074F SYST BP LT 130 MM HG: CPT | Performed by: FAMILY MEDICINE

## 2024-11-25 PROCEDURE — 1159F MED LIST DOCD IN RCRD: CPT | Performed by: FAMILY MEDICINE

## 2024-11-25 ASSESSMENT — PAIN SCALES - GENERAL: PAINLEVEL_OUTOF10: 3

## 2024-11-25 NOTE — PROGRESS NOTES
Subjective   Patient ID: Janneth Celis is a 69 y.o. female. They present today with a chief complaint of Sore Throat (Has spots on roof of mouth, after covid positive 11/12/24) and Nasal Congestion.    History of Present Illness  HPI  Days of sore throat. Mild nasal congestion with postnasal drip. No fevers or chills. No eye redness, discharge or itching. No nausea vomiting or diarrhea. No chest pain, shortness of breath or wheezing noted. No rashes or skin lesions noted. No known exposures to Mono, strep, pneumonia or influenza. Over-the-counter medications taken for symptoms. Nonsmoker.  Patient was diagnosed on 11/12/2024 with COVID.    Past Medical History  Allergies as of 11/25/2024 - Reviewed 11/25/2024   Allergen Reaction Noted    Ragweed pollen Runny nose 08/15/2024    Feathers Runny nose 08/15/2024    Grass pollen Runny nose 08/15/2024    Penicillins Rash 04/07/2023       (Not in a hospital admission)    Current Outpatient Medications:     alendronate (Fosamax) 70 mg tablet, Take 1 tablet (70 mg) by mouth every 7 days. Take in the morning with a full glass of water, on an empty stomach, and do not take anything else by mouth or lie down for the next 30 min., Disp: 12 tablet, Rfl: 3    bimatoprost (Lumigan) 0.01 % ophthalmic solution, Administer 1 drop into both eyes once daily at bedtime., Disp: , Rfl:     calcium/D3/zinc/copper/dallas (CITRACAL-D3 MAXIMUM PLUS ORAL), Take 2 tablets by mouth once daily at noon. Take with meals., Disp: , Rfl:     cholecalciferol (Vitamin D3) 25 MCG (1000 UT) capsule, Take 1 capsule (25 mcg) by mouth once daily at noon. Take with meals., Disp: , Rfl:     clonazePAM (KlonoPIN) 0.5 mg tablet, TAKE 1 TABLET BY MOUTH 2-3 TIMES A DAY AS NEEDED FOR EXTREME ANXIETY OR INSOMNIA, Disp: , Rfl:     clonazePAM (KlonoPIN) 1 mg tablet, Take 1.5 tablets (1.5 mg) by mouth once daily at bedtime., Disp: , Rfl:     escitalopram (Lexapro) 5 mg tablet, Take 1 tablet (5 mg) by mouth early in the  morning.., Disp: , Rfl:     fluticasone (Flonase) 50 mcg/actuation nasal spray, Administer 2 sprays into each nostril once daily as needed for allergies or rhinitis., Disp: , Rfl:     levothyroxine (Synthroid, Levoxyl) 25 mcg tablet, Take 1 tablet (25 mcg) by mouth once daily in the morning. Take before meals. SYNTHROID ONLY., Disp: , Rfl:     metFORMIN (Glucophage) 500 mg tablet, TAKE 2 TABLETS BY MOUTH EVERY DAY (Patient taking differently: Take 1.5 tablets (750 mg) by mouth once daily. Take 1/2 tablet (250mg) in the AM, and 1 tablet (500mg) in the PM), Disp: 180 tablet, Rfl: 2    metoprolol succinate XL (Toprol-XL) 25 mg 24 hr tablet, Take 1 tablet (25 mg) by mouth once daily. Do not crush or chew., Disp: 90 tablet, Rfl: 3    mirtazapine (Remeron) 7.5 mg tablet, Take 1 tablet (7.5 mg) by mouth once daily at bedtime., Disp: , Rfl:     naltrexone (Naltrex) 4.5 mg capsule, Take 4.5 mg by mouth once daily., Disp: 30 capsule, Rfl: 5    pantoprazole (ProtoNix) 20 mg EC tablet, Take 1 tablet (20 mg) by mouth if needed., Disp: , Rfl:     valACYclovir (Valtrex) 1 gram tablet, Take 2 tablets twice a day for 1 day for flare ups, Disp: 20 tablet, Rfl: 3     Past Medical History:   Diagnosis Date    Allergic Childhood - seasonal grass allergy    Anxiety 2000    Diabetes mellitus (Multi) 2018    Disease of thyroid gland 2011    Eczema Childhood    Encounter for gynecological examination (general) (routine) without abnormal findings     Encounter for gynecological examination without abnormal finding    Encounter for screening for cardiovascular disorders     Encounter for screening for cardiovascular disorders    Essential (primary) hypertension 01/07/2013    Reactive hypertension    Glaucoma 2021    HL (hearing loss) 2022    Other conditions influencing health status     Nephrolithiasis    Other conditions influencing health status     Nephrolithiasis    Palpitations 12/14/2012    Palpitations    Personal history of diseases  of the blood and blood-forming organs and certain disorders involving the immune mechanism     History of anemia    Personal history of gestational diabetes     History of gestational diabetes    Personal history of other diseases of the circulatory system     History of hypertension    Personal history of other diseases of the musculoskeletal system and connective tissue     History of fibromyositis    Personal history of other diseases of the nervous system and sense organs     History of glaucoma    Personal history of other diseases of the nervous system and sense organs     History of tinnitus    Personal history of other endocrine, nutritional and metabolic disease     History of hypothyroidism    Personal history of other infectious and parasitic diseases 2013    History of viral gastroenteritis    Personal history of other mental and behavioral disorders     History of anxiety disorder    Unspecified open wound, unspecified foot, initial encounter 2013    Open wound of foot    Unspecified pre-eclampsia, unspecified trimester ([facility])     Preeclampsia    Visual impairment Nearsighted since childhood       Past Surgical History:   Procedure Laterality Date     SECTION, CLASSIC  2012     Section     SECTION, LOW TRANSVERSE  ,     DILATION AND CURETTAGE OF UTERUS  2012    Dilation And Curettage    OTHER SURGICAL HISTORY  2012    Hysteroscopy    OTHER SURGICAL HISTORY  2012    Bladder Cystotomy With Basket Extraction Of Calculus    WISDOM TOOTH EXTRACTION  1972        reports that she has never smoked. She has never used smokeless tobacco. She reports that she does not drink alcohol and does not use drugs.    Review of Systems  Review of Systems                               Objective    Vitals:    24 1606   BP: 128/85   Pulse: 78   Resp: 16   Temp: 36.6 °C (97.9 °F)   TempSrc: Oral   SpO2: 98%   Weight: (!) 42.6 kg (94 lb)   Height: 1.524  m (5')     No LMP recorded.    Physical Exam  Gen- A&O. NAD at rest. Swallowing appears uncomfortable  Ears- canals and TM's appear normal  OP- mildly erythema without exudates. Some mucous in posterier OP .  2-3 1 mm healing white aphthous ulcers on soft palate with no surrounding erythema  Neck- mild anterior lymphadenopathy  Lungs- clear to auscultaion without wheezes or rhonchi  Skin-no rashes, hives or lesions  Procedures    Point of Care Test & Imaging Results from this visit  Results for orders placed or performed in visit on 11/25/24   POCT rapid strep A manually resulted   Result Value Ref Range    POC Rapid Strep Negative Negative      No results found.    Diagnostic study results (if any) were reviewed by Mac Fleming MD.    Assessment/Plan   Allergies, medications, history, and pertinent labs/EKGs/Imaging reviewed by Mac Fleming MD.     Medical Decision Making  At time of discharge patient was clinically well-appearing and HDS for outpatient management. The patient and/or family was educated regarding diagnosis, supportive care, OTC and Rx medications. The patient and/or family was given the opportunity to ask questions prior to discharge.  They verbalized understanding of my discussion of the plans for treatment, expected course, indications to return to  or seek further evaluation in ED, and the need for timely follow up as directed.   They were provided with a work/school excuse if requested.    Orders and Diagnoses  Diagnoses and all orders for this visit:  Sore throat  -     POCT rapid strep A manually resulted      Medical Admin Record      Patient disposition: Home    Electronically signed by Mac Fleming MD  4:26 PM

## 2024-12-10 ENCOUNTER — APPOINTMENT (OUTPATIENT)
Dept: OTOLARYNGOLOGY | Facility: CLINIC | Age: 69
End: 2024-12-10
Payer: MEDICARE

## 2024-12-31 ENCOUNTER — APPOINTMENT (OUTPATIENT)
Dept: SLEEP MEDICINE | Facility: CLINIC | Age: 69
End: 2024-12-31
Payer: MEDICARE

## 2025-01-03 DIAGNOSIS — E11.9 TYPE 2 DIABETES MELLITUS WITHOUT COMPLICATIONS (MULTI): ICD-10-CM

## 2025-01-03 RX ORDER — METFORMIN HYDROCHLORIDE 500 MG/1
1000 TABLET ORAL DAILY
Qty: 180 TABLET | Refills: 3 | Status: SHIPPED | OUTPATIENT
Start: 2025-01-03

## 2025-01-06 ENCOUNTER — APPOINTMENT (OUTPATIENT)
Dept: RHEUMATOLOGY | Facility: CLINIC | Age: 70
End: 2025-01-06
Payer: MEDICARE

## 2025-01-28 ENCOUNTER — PATIENT OUTREACH (OUTPATIENT)
Dept: PRIMARY CARE | Facility: CLINIC | Age: 70
End: 2025-01-28
Payer: MEDICARE

## 2025-01-28 DIAGNOSIS — I10 HTN (HYPERTENSION), BENIGN: ICD-10-CM

## 2025-01-28 DIAGNOSIS — E11.9 TYPE 2 DIABETES MELLITUS WITHOUT COMPLICATION, WITHOUT LONG-TERM CURRENT USE OF INSULIN (MULTI): ICD-10-CM

## 2025-01-28 PROCEDURE — 99490 CHRNC CARE MGMT STAFF 1ST 20: CPT | Performed by: INTERNAL MEDICINE

## 2025-01-28 NOTE — PROGRESS NOTES
Pt has been doing well. Stated she has upcoming appt's with rhematology and nephrology. No specific concerns at this time. Also stated she's still needing to get some dental work done and that has been her main focus right now. Continues to make healthier food choices especially since she's having so much mouth discomf. Has been active and staying busy working up to her normal routine and trying to get back in shape. Advised to call this nurse with any questions or concerns.

## 2025-01-29 ENCOUNTER — APPOINTMENT (OUTPATIENT)
Dept: NEPHROLOGY | Facility: CLINIC | Age: 70
End: 2025-01-29
Payer: MEDICARE

## 2025-01-29 VITALS
DIASTOLIC BLOOD PRESSURE: 95 MMHG | HEIGHT: 60 IN | HEART RATE: 94 BPM | SYSTOLIC BLOOD PRESSURE: 163 MMHG | WEIGHT: 97.6 LBS | BODY MASS INDEX: 19.16 KG/M2

## 2025-01-29 DIAGNOSIS — I10 PRIMARY HYPERTENSION: ICD-10-CM

## 2025-01-29 DIAGNOSIS — E87.1 HYPONATREMIA: ICD-10-CM

## 2025-01-29 PROCEDURE — 3077F SYST BP >= 140 MM HG: CPT | Performed by: INTERNAL MEDICINE

## 2025-01-29 PROCEDURE — 1123F ACP DISCUSS/DSCN MKR DOCD: CPT | Performed by: INTERNAL MEDICINE

## 2025-01-29 PROCEDURE — 1159F MED LIST DOCD IN RCRD: CPT | Performed by: INTERNAL MEDICINE

## 2025-01-29 PROCEDURE — 3008F BODY MASS INDEX DOCD: CPT | Performed by: INTERNAL MEDICINE

## 2025-01-29 PROCEDURE — 1157F ADVNC CARE PLAN IN RCRD: CPT | Performed by: INTERNAL MEDICINE

## 2025-01-29 PROCEDURE — 99204 OFFICE O/P NEW MOD 45 MIN: CPT | Performed by: INTERNAL MEDICINE

## 2025-01-29 PROCEDURE — 3080F DIAST BP >= 90 MM HG: CPT | Performed by: INTERNAL MEDICINE

## 2025-01-29 PROCEDURE — 1036F TOBACCO NON-USER: CPT | Performed by: INTERNAL MEDICINE

## 2025-01-29 NOTE — PROGRESS NOTES
Janneth Celis   69 y.o.      Vitals:    25 1136   Weight: (!) 44.3 kg (97 lb 9.6 oz)      MRN/Room: [id number]/Room/bed info not found        History Of Present Illness  Janneth Celis is a 69 y.o. female presenting with low sodium level.     Past Medical History  She has a past medical history of Allergic (Childhood - seasonal grass allergy), Anxiety (), Diabetes mellitus (Multi) (), Disease of thyroid gland (), Eczema (Childhood), Encounter for gynecological examination (general) (routine) without abnormal findings, Encounter for screening for cardiovascular disorders, Essential (primary) hypertension (2013), Glaucoma (), HL (hearing loss) (), Other conditions influencing health status, Other conditions influencing health status, Palpitations (2012), Personal history of diseases of the blood and blood-forming organs and certain disorders involving the immune mechanism, Personal history of gestational diabetes, Personal history of other diseases of the circulatory system, Personal history of other diseases of the musculoskeletal system and connective tissue, Personal history of other diseases of the nervous system and sense organs, Personal history of other diseases of the nervous system and sense organs, Personal history of other endocrine, nutritional and metabolic disease, Personal history of other infectious and parasitic diseases (2013), Personal history of other mental and behavioral disorders, Unspecified open wound, unspecified foot, initial encounter (2013), Unspecified pre-eclampsia, unspecified trimester ([facility]), and Visual impairment (Nearsighted since childhood).    Surgical History  She has a past surgical history that includes  section, classic (2012); Other surgical history (2012); Dilation and curettage of uterus (2012); Other surgical history (2012);  section, low transverse (, ); and Baker tooth  extraction (1972).     Social History  She reports that she has never smoked. She has never used smokeless tobacco. She reports that she does not drink alcohol and does not use drugs.    Family History  Family History   Problem Relation Name Age of Onset    Other (arthralgia) Mother Dot     Atrial fibrillation Mother Dot     Heart disease Mother Dot     Hypertension Mother Dot     Hypothyroidism Mother Dot     Heart attack Mother Dot     Mitral valve prolapse Mother Dot     Dementia Mother Dot         vascular    Arthritis Mother Dot     Hypertension Father Kurt     Hypothyroidism Father Kurt     Mitral valve prolapse Sister Amy     Hypertension Sister Amy     Hypertension Sister Anya         Allergies  Ragweed pollen, Feathers, Grass pollen, and Penicillins      Meds:         Current Outpatient Medications   Medication Sig Dispense Refill    alendronate (Fosamax) 70 mg tablet Take 1 tablet (70 mg) by mouth every 7 days. Take in the morning with a full glass of water, on an empty stomach, and do not take anything else by mouth or lie down for the next 30 min. 12 tablet 3    bimatoprost (Lumigan) 0.01 % ophthalmic solution Administer 1 drop into both eyes once daily at bedtime.      calcium/D3/zinc/copper/dallas (CITRACAL-D3 MAXIMUM PLUS ORAL) Take 2 tablets by mouth once daily at noon. Take with meals.      cholecalciferol (Vitamin D3) 25 MCG (1000 UT) capsule Take 1 capsule (25 mcg) by mouth once daily at noon. Take with meals.      clonazePAM (KlonoPIN) 0.5 mg tablet TAKE 1 TABLET BY MOUTH 2-3 TIMES A DAY AS NEEDED FOR EXTREME ANXIETY OR INSOMNIA      escitalopram (Lexapro) 5 mg tablet Take 1 tablet (5 mg) by mouth early in the morning.. (Patient taking differently: Take 0.5 tablets (2.5 mg) by mouth early in the morning..)      fluticasone (Flonase) 50 mcg/actuation nasal spray Administer 2 sprays into each nostril once daily as needed for allergies or rhinitis.      levothyroxine (Synthroid, Levoxyl) 25  mcg tablet Take 1 tablet (25 mcg) by mouth once daily in the morning. Take before meals. SYNTHROID ONLY.      metFORMIN (Glucophage) 500 mg tablet TAKE 2 TABLETS BY MOUTH EVERY  tablet 3    metoprolol succinate XL (Toprol-XL) 25 mg 24 hr tablet Take 1 tablet (25 mg) by mouth once daily. Do not crush or chew. 90 tablet 3    naltrexone (Naltrex) 4.5 mg capsule Take 4.5 mg by mouth once daily. 30 capsule 5    pantoprazole (ProtoNix) 20 mg EC tablet Take 1 tablet (20 mg) by mouth if needed.      valACYclovir (Valtrex) 1 gram tablet Take 2 tablets twice a day for 1 day for flare ups 20 tablet 3    clonazePAM (KlonoPIN) 1 mg tablet Take 1.5 tablets (1.5 mg) by mouth once daily at bedtime.      mirtazapine (Remeron) 7.5 mg tablet Take 1 tablet (7.5 mg) by mouth once daily at bedtime.       No current facility-administered medications for this visit.         ROS:  The patient is awake and oriented. No dizziness or lightheadedness. No chills and no fever. No headaches. No nausea and no vomiting. No shortness of breath. No cough. No chest pain. No abdominal pain. No diarrhea. No hematemesis or hemoptysis. No hematuria. No rectal bleeding. No melena. No epistaxis. No urinary symptoms. No flank pain. No leg edema. No leg pain. No itching. Overall, the rest of the review of systems is also negative.  12 point review of systems otherwise negative as stated in HPI.        Physical Exam:        Vitals:    01/29/25 1136   BP: (!) 163/95   Pulse: 94     General: The patient is awake, oriented, and is not in any distress.  Head and Neck: Normocephalic. No periorbital edema.  Eyes: Not icteric.   Respiratory: Symmetric chest expansion. No respiratory distress.  Skin: No maculopapular rash.  Musculoskeletal: No peripheral edema.  Neuro Exam: Speech is fluent. Moves extremities.        Blood Labs:  No results found for this or any previous visit (from the past 24 hours).   Lab Results   Component Value Date    GLUCOSE 91  09/30/2024    CALCIUM 9.6 09/30/2024     (L) 09/30/2024    K 3.7 09/30/2024    CO2 28 09/30/2024    CL 96 (L) 09/30/2024    BUN 16 09/30/2024    CREATININE 0.72 09/30/2024       Imaging:  === 01/27/21 ===    US THYROID    - Impression -  Stable thyroid nodules. Heterogeneous gland suggests chronic thyroid  disease      Assessment and Plan:  #1 hyponatremia.  Sodium level is low 130s.  Clinically she is euvolemic.  I reviewed her medications.  She is on Lexapro.  This potentially can explain her hyponatremia.  I ordered urine parameters.  I asked her to be on fluid restriction or discussed with her primary care physician to come off Lexapro.  Sodium level will be followed.    2.  Diabetes.  Asked for a spot urine protein to creatinine ratio.  Kidney function is normal.    3.  Hypertension.  Blood pressure is high.  I reviewed her home blood pressure readings and she has perfectly good blood pressure readings at home.  No change in antihypertensive medications.    I will see her in about 6 months for follow-up.    2.    Srinivasan Jackson MD  Senior Attending Physician  Director of Onco-Nephrology Program  Division of Nephrology & Hypertension  [facility]

## 2025-02-06 ENCOUNTER — TELEPHONE (OUTPATIENT)
Dept: PRIMARY CARE | Facility: CLINIC | Age: 70
End: 2025-02-06
Payer: MEDICARE

## 2025-02-06 NOTE — TELEPHONE ENCOUNTER
Fyi Patient did not start Fosamax yet due to recent dental work. Patient is scheduled 2/18 changed to virtual     Physical therapist has been overseeing exercises that patient has been doing

## 2025-02-16 ENCOUNTER — OFFICE VISIT (OUTPATIENT)
Dept: URGENT CARE | Age: 70
End: 2025-02-16
Payer: MEDICARE

## 2025-02-16 VITALS
HEART RATE: 94 BPM | SYSTOLIC BLOOD PRESSURE: 154 MMHG | BODY MASS INDEX: 19.04 KG/M2 | RESPIRATION RATE: 18 BRPM | DIASTOLIC BLOOD PRESSURE: 76 MMHG | OXYGEN SATURATION: 98 % | TEMPERATURE: 98.4 F | WEIGHT: 97 LBS | HEIGHT: 60 IN

## 2025-02-16 DIAGNOSIS — J02.9 SORE THROAT: ICD-10-CM

## 2025-02-16 DIAGNOSIS — J01.91 ACUTE RECURRENT SINUSITIS, UNSPECIFIED LOCATION: Primary | ICD-10-CM

## 2025-02-16 LAB — POC RAPID STREP: NEGATIVE

## 2025-02-16 ASSESSMENT — PATIENT HEALTH QUESTIONNAIRE - PHQ9
2. FEELING DOWN, DEPRESSED OR HOPELESS: NOT AT ALL
1. LITTLE INTEREST OR PLEASURE IN DOING THINGS: NOT AT ALL
SUM OF ALL RESPONSES TO PHQ9 QUESTIONS 1 & 2: 0

## 2025-02-16 ASSESSMENT — ENCOUNTER SYMPTOMS: DEPRESSION: 0

## 2025-02-16 NOTE — PROGRESS NOTES
Subjective   Patient ID: Janneth Celis is a 69 y.o. female. They present today with a chief complaint of Sore Throat (Started last night /On Cefdinir currently/ last dose last night ), Facial Pain (Started 9 days ago ), and Nasal Congestion (Started 2 weeks ago ).    History of Present Illness  HPI  Several hours of sore throat. Mild nasal congestion with postnasal drip for the past 2 weeks.  Patient started a course of cefdinir 3 days ago, was prescribed this by ENT in the past.  Complaint of facial pain for the past 9 days.  No fevers or chills. No eye redness, discharge or itching. No nausea vomiting or diarrhea. No chest pain, shortness of breath or wheezing noted. No rashes or skin lesions noted. No known exposures to Mono, strep, pneumonia or influenza. Over-the-counter medications taken for symptoms. Nonsmoker.    Past Medical History  Allergies as of 02/16/2025 - Reviewed 02/16/2025   Allergen Reaction Noted    Ragweed pollen Runny nose 08/15/2024    Feathers Runny nose 08/15/2024    Grass pollen Runny nose 08/15/2024    Penicillins Rash 04/07/2023       (Not in a hospital admission)       Past Medical History:   Diagnosis Date    Allergic Childhood - seasonal grass allergy    Anxiety 2000    Diabetes mellitus (Multi) 2018    Disease of thyroid gland 2011    Eczema Childhood    Encounter for gynecological examination (general) (routine) without abnormal findings     Encounter for gynecological examination without abnormal finding    Encounter for screening for cardiovascular disorders     Encounter for screening for cardiovascular disorders    Essential (primary) hypertension 01/07/2013    Reactive hypertension    Glaucoma 2021    HL (hearing loss) 2022    Other conditions influencing health status     Nephrolithiasis    Other conditions influencing health status     Nephrolithiasis    Palpitations 12/14/2012    Palpitations    Personal history of diseases of the blood and blood-forming organs and certain  disorders involving the immune mechanism     History of anemia    Personal history of gestational diabetes     History of gestational diabetes    Personal history of other diseases of the circulatory system     History of hypertension    Personal history of other diseases of the musculoskeletal system and connective tissue     History of fibromyositis    Personal history of other diseases of the nervous system and sense organs     History of glaucoma    Personal history of other diseases of the nervous system and sense organs     History of tinnitus    Personal history of other endocrine, nutritional and metabolic disease     History of hypothyroidism    Personal history of other infectious and parasitic diseases 2013    History of viral gastroenteritis    Personal history of other mental and behavioral disorders     History of anxiety disorder    Unspecified open wound, unspecified foot, initial encounter 2013    Open wound of foot    Unspecified pre-eclampsia, unspecified trimester ([facility])     Preeclampsia    Visual impairment Nearsighted since childhood       Past Surgical History:   Procedure Laterality Date     SECTION, CLASSIC  2012     Section     SECTION, LOW TRANSVERSE  ,     DILATION AND CURETTAGE OF UTERUS  2012    Dilation And Curettage    OTHER SURGICAL HISTORY  2012    Hysteroscopy    OTHER SURGICAL HISTORY  2012    Bladder Cystotomy With Basket Extraction Of Calculus    WISDOM TOOTH EXTRACTION          reports that she has never smoked. She has never been exposed to tobacco smoke. She has never used smokeless tobacco. She reports that she does not drink alcohol and does not use drugs.    Review of Systems  Review of Systems     As in history of present illness                          Objective    Vitals:    25 0937   BP: 154/76   BP Location: Right arm   Patient Position: Sitting   Pulse: 94   Resp: 18   Temp: 36.9 °C  (98.4 °F)   SpO2: 98%   Weight: (!) 44 kg (97 lb)   Height: 1.524 m (5')     No LMP recorded. Patient is postmenopausal.    Physical Exam  Gen.-alert cooperative and in no apparent distress  Head and face- no swelling redness, tenderness or rash  Eyes-EOMI, no redness or discharge is noted  ENT- bilateral nasal congestion with clear rhinorrhea and postnasal drip in oral pharynx  Neck- normal range of motion with no lymphadenopathy or mass.   Pulmonary- no respiratory distress noted. Lungs clear to auscultation without wheezes rhonchi or rales  Skin- no rashes discoloration or skin lesions noted  Lymphatic-- no lymph node swelling or tenderness appreciated  Procedures    Point of Care Test & Imaging Results from this visit  Results for orders placed or performed in visit on 02/16/25   POCT rapid strep A manually resulted   Result Value Ref Range    POC Rapid Strep Negative Negative      No results found.    Diagnostic study results (if any) were reviewed by Mac Fleming MD.    Assessment/Plan   Allergies, medications, history, and pertinent labs/EKGs/Imaging reviewed by Mac Fleming MD.     Medical Decision Making  At time of discharge patient was clinically well-appearing and HDS for outpatient management. The patient and/or family was educated regarding diagnosis, supportive care, OTC and Rx medications. The patient and/or family was given the opportunity to ask questions prior to discharge.  They verbalized understanding of my discussion of the plans for treatment, expected course, indications to return to  or seek further evaluation in ED, and the need for timely follow up as directed.   They were provided with a work/school excuse if requested.    Orders and Diagnoses  Diagnoses and all orders for this visit:  Acute recurrent sinusitis, unspecified location  Sore throat  -     POCT rapid strep A manually resulted      Medical Admin Record      Patient disposition: Home    Electronically signed by Mac PALMA  MD Bernadette  9:58 AM

## 2025-02-16 NOTE — PATIENT INSTRUCTIONS
Continue medication as directed  Increase fluids and rest  Although a decongestant would be helpful, you have indicated that you can only tolerate guaifenesin at this time  Motrin or Tylenol as needed for pain or fever  Gargle with lightly salted warm water several times a day  follow up with PCP in 7 days if no improvement

## 2025-02-18 ENCOUNTER — APPOINTMENT (OUTPATIENT)
Dept: RHEUMATOLOGY | Facility: CLINIC | Age: 70
End: 2025-02-18
Payer: MEDICARE

## 2025-02-18 DIAGNOSIS — M81.0 AGE-RELATED OSTEOPOROSIS WITHOUT CURRENT PATHOLOGICAL FRACTURE: ICD-10-CM

## 2025-02-18 DIAGNOSIS — M79.7 FIBROMYALGIA: Primary | ICD-10-CM

## 2025-02-18 PROCEDURE — 1036F TOBACCO NON-USER: CPT | Performed by: INTERNAL MEDICINE

## 2025-02-18 PROCEDURE — 1157F ADVNC CARE PLAN IN RCRD: CPT | Performed by: INTERNAL MEDICINE

## 2025-02-18 PROCEDURE — 1123F ACP DISCUSS/DSCN MKR DOCD: CPT | Performed by: INTERNAL MEDICINE

## 2025-02-18 PROCEDURE — 99214 OFFICE O/P EST MOD 30 MIN: CPT | Performed by: INTERNAL MEDICINE

## 2025-02-18 PROCEDURE — 1160F RVW MEDS BY RX/DR IN RCRD: CPT | Performed by: INTERNAL MEDICINE

## 2025-02-18 PROCEDURE — 1159F MED LIST DOCD IN RCRD: CPT | Performed by: INTERNAL MEDICINE

## 2025-02-18 ASSESSMENT — ENCOUNTER SYMPTOMS
LOSS OF SENSATION IN FEET: 0
DEPRESSION: 0
OCCASIONAL FEELINGS OF UNSTEADINESS: 0

## 2025-02-18 NOTE — PROGRESS NOTES
"Subjective   Patient ID: Janneth Celis is a 69 y.o. female who presents for evaluation of fibromyalgia and osteoporosis.    HPI 69-year-old female here for follow up regarding fibromyalgia and osteoporosis.    Virtual visit is being done today.  She is at home at the time of the visit.  Verbal consent was given.    She relates that she developed diffuse body pain in 1996, following an upper respiratory infection with a \"weird rash\".  She saw an orthopedic surgeon, who thought perhaps she had lupus.  Reportedly her AMANDA test was borderline positive.  She was evaluated by a rheumatologist at the [facility] named Dr. Cerrato, who diagnosed her with fibromyalgia.  The rheumatologist reassured her that she did not have lupus.  She has never been on prescription medication for fibromyalgia.    She does suffer from anxiety and panic attacks.  She sees a psychiatrist and uses Lexapro 2.5 mg daily.    She had decided to not use the naltrexone.  She is working with a  and is feeling better.  She thinks the exercise has helped.  She is thinking of trying flynn chi.    She went to urgent care February 16, 2025 was diagnosed with sinusitis.  Rapid strep test was negative.    Screening bone density done July 2024 shows osteoporosis, with T-score -3.2 in the lumbar spine.  She has no history of fractures as an adult.  She currently weighs 97 pounds with BMI 18.9..  Her heaviest weight as an adult was 119 pounds.  There is no definite family history of osteoporosis.  Last menstrual period was age 54.  Neither parent fractured hip.    DEXA June 2024: T-score -3.2 lumbar spine, -1.4 left total hip, -1.9 left femoral neck    She was given a prescription for alendronate 70 mg orally weekly August 2024.  She does take Citracal 600 mg daily and vitamin D 2000 international unit daily.  She drinks 1 Ensure daily.    She has not started the alendronate because she saw her dentist and she needs more invasive dental work.  She " had 2 root canals but 1 failed.  She had a tooth extracted and she had to get a bone graft in preparation for a dental implant.  She apparently is missing a tooth in her upper arch of the right and she may need a second dental implant.    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  She did see her dentist regarding this.    Labs 2024: CMP normal except sodium 131, CRP less than 0.1, TSH 3.8, CBC normal  Labs : AMANAD 1:80 with negative AMANDA panel, TSH 2.44, hemoglobin A1c 6.1  Labs 2025: Hemoglobin A1c 5.6    Medical problem list:  Type 2 diabetes  Palpitations  Glaucoma  Hypothyroidism  Osteoporosis  Fibromyalgia  Agoraphobia with panic disorder  Anxiety  Kidney stones  Essential hypertension and white coat hypertension    Medications: Reviewed as documented  Surgeries:   section  D&C  Hysteroscopy  Bladder cystotomy for extraction of calculus  [place] tooth extraction    Social history: .  She denies use of tobacco.   She denies use of alcohol.    Family history:  Mother-atrial fibrillation, arthritis, dementia, coronary artery disease, hypertension, hypothyroidism, mitral valve prolapse, PMR, OA  Father-hypertension, hypothyroidism  Sister-hypertension, spinal stenosis  Cousin- fibromyalgia    Review of Systems  General: Denies fevers or chills.  CV: Denies chest pain or palpitations.  Denies leg edema.  Lungs: Denies coughing or shortness of breath.  Skin: Denies rashes or nodules.  MS: Denies joint pain or joint swelling.     Objective   Visit Vitals  OB Status Postmenopausal   Smoking Status Never        Physical Exam  HEENT: External exam of the eyes and ears is normal.  CV: No apparent edema.  Lungs: Normal respiratory effort.  Neuro: Affect appropriate.      Assessment/Plan   Problem List Items Addressed This Visit             ICD-10-CM    Fibromyalgia - Primary M79.7    Age-related osteoporosis without current pathological fracture M81.0     Fibromyalgia-first diagnosed in .  Has never taken prescription medication.   Currently doing well exercising with a physical therapist.  She is doing low impact exercise and resistance training with 2 pound weights .  She is thinking of trying flynn chi .  I do not think she needs prescription medication at present .    History of low positive AMANDA-likely false positive.    Osteoporosis- Risk factors are post menopausal status, , slight body weight.  She was advised to hold the alendronate until she completes a dental implant process.    Plan:  Recommend hold alendronate until dental implant is done.  Continue Vitamin D 2000 international units daily.  Next bone density will be due June 2026.

## 2025-02-18 NOTE — PATIENT INSTRUCTIONS
Recommend hold alendronate until dental implant is done.  Continue Vitamin D 2000 international units daily.  Next bone density will be due June 2026.

## 2025-02-28 ENCOUNTER — PATIENT OUTREACH (OUTPATIENT)
Dept: PRIMARY CARE | Facility: CLINIC | Age: 70
End: 2025-02-28
Payer: MEDICARE

## 2025-02-28 DIAGNOSIS — I10 HYPERTENSION, UNSPECIFIED TYPE: ICD-10-CM

## 2025-02-28 DIAGNOSIS — E11.9 TYPE 2 DIABETES MELLITUS WITHOUT COMPLICATION, WITHOUT LONG-TERM CURRENT USE OF INSULIN (MULTI): ICD-10-CM

## 2025-02-28 PROCEDURE — 99490 CHRNC CARE MGMT STAFF 1ST 20: CPT | Performed by: INTERNAL MEDICINE

## 2025-02-28 NOTE — PROGRESS NOTES
Pt had a recent appt for cold symptoms/sore throat.. encouraged to increase fluids and rest. No other specific concerns. Has follow up appt's with different specialists as scheduled. Current med list up to date. Aware to call this nurse for any needs

## 2025-03-31 ENCOUNTER — PATIENT OUTREACH (OUTPATIENT)
Dept: PRIMARY CARE | Facility: CLINIC | Age: 70
End: 2025-03-31
Payer: MEDICARE

## 2025-03-31 DIAGNOSIS — E11.9 TYPE 2 DIABETES MELLITUS WITHOUT COMPLICATION, WITHOUT LONG-TERM CURRENT USE OF INSULIN: ICD-10-CM

## 2025-03-31 DIAGNOSIS — I10 HYPERTENSION, UNSPECIFIED TYPE: ICD-10-CM

## 2025-04-30 ENCOUNTER — PATIENT OUTREACH (OUTPATIENT)
Dept: PRIMARY CARE | Facility: CLINIC | Age: 70
End: 2025-04-30
Payer: MEDICARE

## 2025-04-30 DIAGNOSIS — I10 HYPERTENSION, UNSPECIFIED TYPE: ICD-10-CM

## 2025-04-30 DIAGNOSIS — E11.9 TYPE 2 DIABETES MELLITUS WITHOUT COMPLICATION, WITHOUT LONG-TERM CURRENT USE OF INSULIN: ICD-10-CM

## 2025-05-10 ENCOUNTER — OFFICE VISIT (OUTPATIENT)
Dept: URGENT CARE | Age: 70
End: 2025-05-10
Payer: MEDICARE

## 2025-05-10 VITALS
HEART RATE: 101 BPM | TEMPERATURE: 98 F | HEIGHT: 60 IN | BODY MASS INDEX: 19.04 KG/M2 | DIASTOLIC BLOOD PRESSURE: 87 MMHG | RESPIRATION RATE: 19 BRPM | OXYGEN SATURATION: 96 % | SYSTOLIC BLOOD PRESSURE: 176 MMHG | WEIGHT: 97 LBS

## 2025-05-10 DIAGNOSIS — N30.01 ACUTE CYSTITIS WITH HEMATURIA: Primary | ICD-10-CM

## 2025-05-10 LAB
POC APPEARANCE, URINE: ABNORMAL
POC BILIRUBIN, URINE: NEGATIVE
POC BLOOD, URINE: ABNORMAL
POC COLOR, URINE: YELLOW
POC GLUCOSE, URINE: NEGATIVE MG/DL
POC KETONES, URINE: NEGATIVE MG/DL
POC LEUKOCYTES, URINE: ABNORMAL
POC NITRITE,URINE: NEGATIVE
POC PH, URINE: 6 PH
POC PROTEIN, URINE: NEGATIVE MG/DL
POC SPECIFIC GRAVITY, URINE: 1.02
POC UROBILINOGEN, URINE: 0.2 EU/DL

## 2025-05-10 RX ORDER — CEPHALEXIN 500 MG/1
500 CAPSULE ORAL 2 TIMES DAILY
Qty: 20 CAPSULE | Refills: 0 | Status: SHIPPED | OUTPATIENT
Start: 2025-05-10 | End: 2025-05-20

## 2025-05-10 ASSESSMENT — ENCOUNTER SYMPTOMS
NAUSEA: 0
HEMATURIA: 0
DYSURIA: 1
OCCASIONAL FEELINGS OF UNSTEADINESS: 0
VOMITING: 0
CONSTITUTIONAL NEGATIVE: 1
GASTROINTESTINAL NEGATIVE: 1
RESPIRATORY NEGATIVE: 1
CARDIOVASCULAR NEGATIVE: 1
DEPRESSION: 0
CHILLS: 0
FEVER: 0
DIARRHEA: 0
FLANK PAIN: 0
FREQUENCY: 1
LOSS OF SENSATION IN FEET: 0

## 2025-05-10 NOTE — PROGRESS NOTES
Subjective   Patient ID: Janneth Celis is a 69 y.o. female. They present today with a chief complaint of UTI (With a strong smell ).    History of Present Illness  Subjective  Janneth Celis is a 69 y.o. female who complains of dysuria, foul smelling urine, frequency, and suprapubic pressure for 1 day. Patient does not have a history of recurrent UTI or pyelonephritis.    @[facility]@    Objective  /87   Pulse 101   Temp 36.7 °C (98 °F)   Resp 19   Ht 1.524 m (5')   Wt (!) 44 kg (97 lb)   SpO2 96%   BMI 18.94 kg/m²    General: alert and oriented, in no acute distress  Abdomen: soft, nondistended, normal bowel sounds, tenderness mild suprapubic, without guarding, without rebound, no masses palpated, and without organomegaly  Back: CVA tenderness absent    Laboratory:   Urine dipstick shows +leukocytes, +blood.    Micro exam: pending.    Assessment/Plan  Diagnoses and associated orders for this visit:    · Acute cystitis with hematuria   POCT UA Automated manually resulted   cephalexin (Keflex) 500 mg capsule; Take 1 capsule (500 mg) by mouth 2 times a day for 10 days. Discard remaining pills.            History provided by:  Patient and medical records  UTI  Associated symptoms: no diarrhea, no fever, no nausea and no vomiting        Past Medical History  Allergies as of 05/10/2025 - Reviewed 05/10/2025   Allergen Reaction Noted    Ragweed pollen Runny nose 08/15/2024    Feathers Runny nose 08/15/2024    Grass pollen Runny nose 08/15/2024    Penicillins Rash 04/07/2023       Prescriptions Prior to Admission[1]     Medical History[2]    Surgical History[3]     reports that she has never smoked. She has never been exposed to tobacco smoke. She has never used smokeless tobacco. She reports that she does not drink alcohol and does not use drugs.    Review of Systems  Review of Systems   Constitutional: Negative.  Negative for chills and fever.   HENT: Negative.     Respiratory: Negative.     Cardiovascular:  Negative.    Gastrointestinal: Negative.  Negative for diarrhea, nausea and vomiting.   Genitourinary:  Positive for dysuria, frequency and urgency. Negative for flank pain and hematuria.   All other systems reviewed and are negative.                                 Objective    Vitals:    05/10/25 1736   BP: 176/87   Pulse: 101   Resp: 19   Temp: 36.7 °C (98 °F)   SpO2: 96%   Weight: (!) 44 kg (97 lb)   Height: 1.524 m (5')     No LMP recorded. Patient is postmenopausal.    Physical Exam  Vitals and nursing note reviewed.   Constitutional:       Appearance: She is not ill-appearing or toxic-appearing.   HENT:      Head: Normocephalic and atraumatic.   Cardiovascular:      Rate and Rhythm: Normal rate and regular rhythm.      Pulses: Normal pulses.      Heart sounds: Normal heart sounds.   Pulmonary:      Effort: Pulmonary effort is normal.      Breath sounds: Normal breath sounds.   Abdominal:      General: Abdomen is flat. Bowel sounds are normal.      Palpations: Abdomen is soft.      Tenderness: There is abdominal tenderness (suprapubic). There is no right CVA tenderness or left CVA tenderness.   Skin:     General: Skin is warm and dry.      Capillary Refill: Capillary refill takes less than 2 seconds.   Neurological:      Mental Status: She is alert and oriented to person, place, and time.   Psychiatric:         Behavior: Behavior normal.         Procedures    Point of Care Test & Imaging Results from this visit  Results for orders placed or performed in visit on 05/10/25   POCT UA Automated manually resulted   Result Value Ref Range    POC Color, Urine Yellow Straw, Yellow, Light-Yellow    POC Appearance, Urine Cloudy (A) Clear    POC Glucose, Urine NEGATIVE NEGATIVE mg/dl    POC Bilirubin, Urine NEGATIVE NEGATIVE    POC Ketones, Urine NEGATIVE NEGATIVE mg/dl    POC Specific Gravity, Urine 1.020 1.005 - 1.035    POC Blood, Urine LARGE (3+) (A) NEGATIVE    POC PH, Urine 6.0 No Reference Range Established PH     POC Protein, Urine NEGATIVE NEGATIVE mg/dl    POC Urobilinogen, Urine 0.2 0.2, 1.0 EU/DL    Poc Nitrite, Urine NEGATIVE NEGATIVE    POC Leukocytes, Urine SMALL (1+) (A) NEGATIVE      Imaging  No results found.    Cardiology, Vascular, and Other Imaging  No other imaging results found for the past 2 days      Diagnostic study results (if any) were reviewed by DANISHA Chavez.    Assessment/Plan   Allergies, medications, history, and pertinent labs/EKGs/Imaging reviewed by DANISHA Chavez.     Medical Decision Making  Antibiotic treatment regimen discussed; pt states she has taken Keflex in the past without adverse reaction. She states PCN was an allergy she had from childhood.   Risks, benefits, and alternatives of the medications and treatment plan prescribed today were discussed, and patient expressed understanding. Plan follow up as discussed or as needed if any worsening symptoms or change in condition. Reinforced red flags including (but not limited to): severe or worsening pain; difficulty swallowing; stiff neck; shortness of breath; coughing or vomiting blood; chest pain; and new or increased fever are indications to go to the Emergency Department.  At time of discharge patient was clinically well-appearing and HDS for outpatient management. The patient and/or family was educated regarding diagnosis, supportive care, OTC and Rx medications. The patient and/or family was given the opportunity to ask questions prior to discharge.  They verbalized understanding of my discussion of the plans for treatment, expected course, indications to return to  or seek further evaluation in ED, and the need for timely follow up as directed.   They were provided with a work/school excuse if requested. The after-visit summary was given to the patient and care instructions were reviewed with the patient. All questions were answered and the patient verbalized understanding of the plan of care for  today.  Plan:  Recent visit notes reviewed  Meds as above  Increase clear fluids  Pcp follow up this week if not improving or worsening  ER visit anytime 24/7 for acute worsening or changing condition      Orders and Diagnoses  Diagnoses and all orders for this visit:  Acute cystitis with hematuria  -     POCT UA Automated manually resulted  -     cephalexin (Keflex) 500 mg capsule; Take 1 capsule (500 mg) by mouth 2 times a day for 10 days. Discard remaining pills.  -     Urine Culture      Medical Admin Record      Patient disposition: Home    Electronically signed by DANISHA Chavez  6:03 PM           [1] (Not in a hospital admission)   [2]   Past Medical History:  Diagnosis Date    Allergic Childhood - seasonal grass allergy    Anxiety 2000    Diabetes mellitus (Multi) 2018    Disease of thyroid gland 2011    Eczema Childhood    Encounter for gynecological examination (general) (routine) without abnormal findings     Encounter for gynecological examination without abnormal finding    Encounter for screening for cardiovascular disorders     Encounter for screening for cardiovascular disorders    Essential (primary) hypertension 01/07/2013    Reactive hypertension    Fibromyalgia, primary 1997    Frozen shoulder     Glaucoma 2021    HL (hearing loss) 2022    Other conditions influencing health status     Nephrolithiasis    Other conditions influencing health status     Nephrolithiasis    Palpitations 12/14/2012    Palpitations    Personal history of diseases of the blood and blood-forming organs and certain disorders involving the immune mechanism     History of anemia    Personal history of gestational diabetes     History of gestational diabetes    Personal history of other diseases of the circulatory system     History of hypertension    Personal history of other diseases of the musculoskeletal system and connective tissue     History of fibromyositis    Personal history of other diseases of the  nervous system and sense organs     History of glaucoma    Personal history of other diseases of the nervous system and sense organs     History of tinnitus    Personal history of other endocrine, nutritional and metabolic disease     History of hypothyroidism    Personal history of other infectious and parasitic diseases 2013    History of viral gastroenteritis    Personal history of other mental and behavioral disorders     History of anxiety disorder    Unspecified open wound, unspecified foot, initial encounter 2013    Open wound of foot    Unspecified pre-eclampsia, unspecified trimester ([facility])     Preeclampsia    Visual impairment Nearsighted since childhood   [3]   Past Surgical History:  Procedure Laterality Date     SECTION, CLASSIC  2012     Section     SECTION, LOW TRANSVERSE  ,     DILATION AND CURETTAGE OF UTERUS  2012    Dilation And Curettage    OTHER SURGICAL HISTORY  2012    Hysteroscopy    OTHER SURGICAL HISTORY  2012    Bladder Cystotomy With Basket Extraction Of Calculus    WISDOM TOOTH EXTRACTION  1972

## 2025-05-12 LAB — BACTERIA UR CULT: ABNORMAL

## 2025-05-30 ENCOUNTER — PATIENT OUTREACH (OUTPATIENT)
Dept: PRIMARY CARE | Facility: CLINIC | Age: 70
End: 2025-05-30
Payer: MEDICARE

## 2025-05-30 DIAGNOSIS — E11.9 TYPE 2 DIABETES MELLITUS WITHOUT COMPLICATION, WITHOUT LONG-TERM CURRENT USE OF INSULIN: ICD-10-CM

## 2025-05-30 DIAGNOSIS — I10 HYPERTENSION, UNSPECIFIED TYPE: ICD-10-CM

## 2025-06-14 ENCOUNTER — OFFICE VISIT (OUTPATIENT)
Dept: URGENT CARE | Age: 70
End: 2025-06-14
Payer: MEDICARE

## 2025-06-14 VITALS
WEIGHT: 97 LBS | HEIGHT: 60 IN | RESPIRATION RATE: 18 BRPM | BODY MASS INDEX: 19.04 KG/M2 | HEART RATE: 85 BPM | DIASTOLIC BLOOD PRESSURE: 89 MMHG | TEMPERATURE: 97.9 F | OXYGEN SATURATION: 99 % | SYSTOLIC BLOOD PRESSURE: 147 MMHG

## 2025-06-14 DIAGNOSIS — J01.90 ACUTE SINUSITIS, RECURRENCE NOT SPECIFIED, UNSPECIFIED LOCATION: Primary | ICD-10-CM

## 2025-06-14 RX ORDER — DOXYCYCLINE 100 MG/1
100 CAPSULE ORAL 2 TIMES DAILY
Qty: 20 CAPSULE | Refills: 0 | Status: SHIPPED | OUTPATIENT
Start: 2025-06-14 | End: 2025-06-24

## 2025-06-14 ASSESSMENT — ENCOUNTER SYMPTOMS
LOSS OF SENSATION IN FEET: 0
DEPRESSION: 0
OCCASIONAL FEELINGS OF UNSTEADINESS: 0

## 2025-06-14 NOTE — PROGRESS NOTES
Subjective   Patient ID: Janneth Celis is a 70 y.o. female. They present today with a chief complaint of Illness (10 days/sinus facial pain , congestion).    Patient disposition: Home    History of Present Illness  HPI  10 days of facial pain, congestion, postnasal drainage.  [facility], diagnosed with sinusitis and placed on Z-Mike but states there was no improvement of symptoms.  Feels ears are plugged up, has follow-up with ENT in 2 days to have wax removed as she has stenotic ear canals.  No fever or chills.  Postnasal drainage down the right side of her throat.  No chest congestion.  Taking Tessalon with improvement of cough.  Seasonal allergies to grass.  COVID and flu testing were negative.    Past Medical History  Allergies as of 06/14/2025 - Reviewed 06/14/2025   Allergen Reaction Noted    Ragweed pollen Runny nose 08/15/2024    Feathers Runny nose 08/15/2024    Grass pollen Runny nose 08/15/2024    Penicillins Rash 04/07/2023       Prescriptions Prior to Admission[1]     Current Medications[2]    Problem List[3]    Surgical History[4]     reports that she has never smoked. She has never been exposed to tobacco smoke. She has never used smokeless tobacco. She reports that she does not drink alcohol and does not use drugs.    Review of Systems  As noted in HPI. ROS otherwise negative unless noted.       Objective    Vitals:    06/14/25 0944   BP: 147/89   BP Location: Right arm   Patient Position: Sitting   BP Cuff Size: Adult   Pulse: 85   Resp: 18   Temp: 36.6 °C (97.9 °F)   TempSrc: Oral   SpO2: 99%   Weight: (!) 44 kg (97 lb)   Height: 1.524 m (5')     No LMP recorded. Patient is postmenopausal.    Physical Exam  Constitutional: vital signs reviewed. Well developed, well nourished. patient alert and patient without distress.   Head and Face: Normal and atraumatic.  Palpation of the face and sinuses: Frontal, maxillary tenderness  Ears, Nose, Mouth, and Throat:   Hearing: Normal.  External  inspection of nose: Normal.   Lips, teeth, tongue and gums: Normal and well hydrated. External inspection of ears: Normal. Ear canals and TMs: Normal.  Posterior pharynx moist, no exudate, symmetric, no abscess, and with post nasal drip.  Nasal mucosa: Congested, inflamed  Lymphatic: No cervical lymphadenopathy  Neck: No neck mass was observed. Supple. normal muscle tone.   Cardiovascular: Heart rate normal, normal S1 and S2, no gallops, no murmurs and no pericardial rub. Rhythm: Normal.  Pulmonary: No respiratory distress. Palpation of chest: Normal. Clear bilateral breath sounds.   Psych: Normal mood and affect        Procedures    Point of Care Test & Imaging Results from this visit           Diagnostic study results (if any) were reviewed.  (If applicable) preliminary radiology reading: None    Assessment/Plan   Allergies, medications, history, and pertinent labs/EKGs/Imaging reviewed.        Medical Decision Making  See note    Orders and Diagnoses  There are no diagnoses linked to this encounter.    Medical Admin Record      Follow Up Instructions  No follow-ups on file.    At time of discharge patient was clinically well-appearing and HDS for outpatient management. The patient and/or family was educated regarding diagnosis, supportive care, OTC and Rx medications. The patient and/or family was given the opportunity to ask questions prior to discharge and all questions answered. They verbalized understanding of my discussion of the plans for treatment, expected course, indications to return to  or seek further evaluation in ED, and the need for timely follow up as directed.      Electronically signed by [facility]           [1] (Not in a hospital admission)  [2]   Current Outpatient Medications   Medication Sig Dispense Refill    bimatoprost (Lumigan) 0.01 % ophthalmic solution Administer 1 drop into both eyes once daily at bedtime.      calcium/D3/zinc/copper/dallas (CITRACAL-D3 MAXIMUM PLUS ORAL)  Take 2 tablets by mouth once daily at noon. Take with meals.      cholecalciferol (Vitamin D3) 25 MCG (1000 UT) capsule Take 1 capsule (25 mcg) by mouth once daily at noon. Take with meals.      clonazePAM (KlonoPIN) 0.5 mg tablet TAKE 1 TABLET BY MOUTH 2-3 TIMES A DAY AS NEEDED FOR EXTREME ANXIETY OR INSOMNIA      escitalopram (Lexapro) 5 mg tablet Take 1 tablet (5 mg) by mouth early in the morning..      levothyroxine (Synthroid, Levoxyl) 25 mcg tablet Take 1 tablet (25 mcg) by mouth once daily in the morning. Take before meals. SYNTHROID ONLY.      metFORMIN (Glucophage) 500 mg tablet TAKE 2 TABLETS BY MOUTH EVERY  tablet 3    metoprolol succinate XL (Toprol-XL) 25 mg 24 hr tablet Take 1 tablet (25 mg) by mouth once daily. Do not crush or chew. 90 tablet 3    valACYclovir (Valtrex) 1 gram tablet Take 2 tablets twice a day for 1 day for flare ups 20 tablet 3    alendronate (Fosamax) 70 mg tablet Take 1 tablet (70 mg) by mouth every 7 days. Take in the morning with a full glass of water, on an empty stomach, and do not take anything else by mouth or lie down for the next 30 min. (Patient not taking: Reported on 6/14/2025) 12 tablet 3    clonazePAM (KlonoPIN) 1 mg tablet Take 1.5 tablets (1.5 mg) by mouth once daily at bedtime.      fluticasone (Flonase) 50 mcg/actuation nasal spray Administer 2 sprays into each nostril once daily as needed for allergies or rhinitis. (Patient not taking: Reported on 6/14/2025)      pantoprazole (ProtoNix) 20 mg EC tablet Take 1 tablet (20 mg) by mouth if needed. (Patient not taking: Reported on 6/14/2025)       No current facility-administered medications for this visit.   [3]   Patient Active Problem List  Diagnosis    Allergic rhinitis    Anxiety disorder    Benign essential hypertension    Herpes simplex type 1 infection    Other abnormal glucose    Hypothyroidism    Thyroid nodule    Type 2 diabetes mellitus    Respiratory tract infection due to COVID-19 virus    Medicare  annual wellness visit, subsequent    Other screening mammogram    Asymptomatic menopause    Colon cancer screening    Calculus of kidney    Excessive or frequent menstruation    Fibromyalgia    Gestational diabetes    Hiatal hernia    Irritable bowel syndrome    Predominant disturbance of emotions    Preeclampsia (HHS-HCC)    TMJ (temporomandibular joint disorder)    Valgus deformity of both great toes    Essential hypertension, benign    Controlled type 2 diabetes mellitus without complication, without long-term current use of insulin    Acute non-recurrent frontal sinusitis    Adhesive capsulitis of shoulder    Contact with and (suspected) exposure to covid-19    History of anemia    History of gestational diabetes mellitus (GDM)    History of glaucoma    History of hearing problem    History of hypertension    History of hypothyroidism    History of viral infection    Open wound of foot    Palpitations    Secondary hypertension    Upper respiratory tract infection    Strep pharyngitis    Depression, recurrent    Chest pain    Sore throat    Hyponatremia    Abnormal EKG    Atrial enlargement, bilateral    PND (paroxysmal nocturnal dyspnea)    Other fatigue    Body mass index (BMI) 19.9 or less, adult    Age-related osteoporosis without current pathological fracture   [4]   Past Surgical History:  Procedure Laterality Date     SECTION, CLASSIC  2012     Section     SECTION, LOW TRANSVERSE  ,     DILATION AND CURETTAGE OF UTERUS  2012    Dilation And Curettage    OTHER SURGICAL HISTORY  2012    Hysteroscopy    OTHER SURGICAL HISTORY  2012    Bladder Cystotomy With Basket Extraction Of Calculus    WISDOM TOOTH EXTRACTION  1972

## 2025-06-17 ENCOUNTER — APPOINTMENT (OUTPATIENT)
Facility: CLINIC | Age: 70
End: 2025-06-17
Payer: MEDICARE

## 2025-06-17 VITALS
DIASTOLIC BLOOD PRESSURE: 103 MMHG | WEIGHT: 97 LBS | HEIGHT: 60 IN | SYSTOLIC BLOOD PRESSURE: 174 MMHG | BODY MASS INDEX: 19.04 KG/M2

## 2025-06-17 DIAGNOSIS — H61.23 BILATERAL IMPACTED CERUMEN: ICD-10-CM

## 2025-06-17 DIAGNOSIS — J30.89 ALLERGIC RHINITIS DUE TO OTHER ALLERGIC TRIGGER, UNSPECIFIED SEASONALITY: Primary | ICD-10-CM

## 2025-06-17 DIAGNOSIS — J02.9 SORE THROAT: ICD-10-CM

## 2025-06-17 DIAGNOSIS — I10 WHITE COAT SYNDROME WITH DIAGNOSIS OF HYPERTENSION: ICD-10-CM

## 2025-06-17 PROCEDURE — 1160F RVW MEDS BY RX/DR IN RCRD: CPT | Performed by: STUDENT IN AN ORGANIZED HEALTH CARE EDUCATION/TRAINING PROGRAM

## 2025-06-17 PROCEDURE — 3008F BODY MASS INDEX DOCD: CPT | Performed by: STUDENT IN AN ORGANIZED HEALTH CARE EDUCATION/TRAINING PROGRAM

## 2025-06-17 PROCEDURE — 1036F TOBACCO NON-USER: CPT | Performed by: STUDENT IN AN ORGANIZED HEALTH CARE EDUCATION/TRAINING PROGRAM

## 2025-06-17 PROCEDURE — 69210 REMOVE IMPACTED EAR WAX UNI: CPT | Performed by: STUDENT IN AN ORGANIZED HEALTH CARE EDUCATION/TRAINING PROGRAM

## 2025-06-17 PROCEDURE — 3077F SYST BP >= 140 MM HG: CPT | Performed by: STUDENT IN AN ORGANIZED HEALTH CARE EDUCATION/TRAINING PROGRAM

## 2025-06-17 PROCEDURE — 99213 OFFICE O/P EST LOW 20 MIN: CPT | Performed by: STUDENT IN AN ORGANIZED HEALTH CARE EDUCATION/TRAINING PROGRAM

## 2025-06-17 PROCEDURE — 1159F MED LIST DOCD IN RCRD: CPT | Performed by: STUDENT IN AN ORGANIZED HEALTH CARE EDUCATION/TRAINING PROGRAM

## 2025-06-17 PROCEDURE — 3080F DIAST BP >= 90 MM HG: CPT | Performed by: STUDENT IN AN ORGANIZED HEALTH CARE EDUCATION/TRAINING PROGRAM

## 2025-06-17 NOTE — PROGRESS NOTES
ENT Outpatient Consultation    Chief Complaint: Ear cleaning, sinusitis    Interval history: Janneth returns today for ear cleaning.  Since she was last seen, her pulsatile tinnitus has resolved.  She now just has her baseline bilateral tinnitus.  She recently was treated with a Z-Mike for a sinus infection.  She feels like her symptoms are mostly improved however she has a sore throat that she attributes to postnasal drainage.  Her facial pressure and pain and discolored drainage has resolved after completing the antibiotic.  She continues on daily Flonase use.     History Of Present Illness (recall 9/3/2024)  Janneth Celis is a 70 y.o. female presents for tinnitus. She reports a history of longstanding non-pulsatile tinnitus since her 40s which at this time is not overly bothersome. A few weeks ago she developed a sinus infection which was successfully treated with antibiotics. Since then, she has noticed intermittent pulsatile tinnitus in the right ear which corresponds with her heartbeat. This is not present all the time and tends to get worse which she is anxious. No otorrhea, prior ear surgeries, or vertigo. Of note, she has a decayed right upper molar that is painful and she may require a root canal for. Also endorses significant TMJ although she doesn't wear a mouthguard.     Past Medical History  She has a past medical history of Allergic (Childhood - seasonal grass allergy), Anxiety (2000), Diabetes mellitus (Multi) (2018), Disease of thyroid gland (2011), Eczema (Childhood), Encounter for gynecological examination (general) (routine) without abnormal findings, Encounter for screening for cardiovascular disorders, Essential (primary) hypertension (01/07/2013), Fibromyalgia, primary (1997), Frozen shoulder, Glaucoma (2021), HL (hearing loss) (2022), Other conditions influencing health status, Other conditions influencing health status, Palpitations (12/14/2012), Personal history of diseases of the blood and  blood-forming organs and certain disorders involving the immune mechanism, Personal history of gestational diabetes, Personal history of other diseases of the circulatory system, Personal history of other diseases of the musculoskeletal system and connective tissue, Personal history of other diseases of the nervous system and sense organs, Personal history of other diseases of the nervous system and sense organs, Personal history of other endocrine, nutritional and metabolic disease, Personal history of other infectious and parasitic diseases (2013), Personal history of other mental and behavioral disorders, Unspecified open wound, unspecified foot, initial encounter (2013), Unspecified pre-eclampsia, unspecified trimester ([facility]), and Visual impairment (Nearsighted since childhood).    Surgical History  She has a past surgical history that includes  section, classic (2012); Other surgical history (2012); Dilation and curettage of uterus (2012); Other surgical history (2012);  section, low transverse (, ); and [place] tooth extraction ().     Social History  She reports that she has never smoked. She has never been exposed to tobacco smoke. She has never used smokeless tobacco. She reports that she does not drink alcohol and does not use drugs.    Family History  Family History   Problem Relation Name Age of Onset    Other (arthralgia) Mother Dot     Atrial fibrillation Mother Dot     Heart disease Mother Dot     Hypertension Mother Dot     Hypothyroidism Mother Dot     Heart attack Mother Dot     Mitral valve prolapse Mother Dot     Dementia Mother Dot         vascular    Arthritis Mother Dot     Autoimmune disease Mother Dot     Hypertension Father Kurt     Hypothyroidism Father Kurt     Hearing loss Father Kurt     Mitral valve prolapse Sister Amy     Hypertension Sister Amy     Hypertension Sister Anya     Arthritis Sister Anya          Allergies  Ragweed pollen, Feathers, Grass pollen, and Penicillins     Physical Exam:  CONSTITUTIONAL:  No acute distress, appears anxious  VOICE:  No hoarseness or other abnormality  RESPIRATION:  Breathing comfortably, no stridor  EYES:  EOM intact, sclera normal  NEURO:  Alert and oriented times 3  HEAD AND FACE:  clicking of bilateral TMJ on mouth closing  EARS:  Normal external ears, see otomicroscopy  NOSE:  External nose midline, anterior rhinoscopy shows mild boggy inferior turbinate hypertrophy bilaterally, able to visualize the middle meatus bilaterally without mucus or draining purulence, no signs of infection  ORAL CAVITY/OROPHARYNX/LIPS:  Normal mucous membranes, normal floor of mouth/tongue/OP, no masses or lesions; mild erythema of the anterior tonsillar pillar, no tonsillar swelling or exudates  PHARYNGEAL WALLS:  No masses or lesions  NECK/LYMPH:  No LAD, no thyroid masses, trachea midline  SKIN:  Neck skin is without scar or injury  PSYCH:  Alert and oriented with appropriate mood and affect    Procedure: Otomicroscopy with removal of deeply impacted cerumen  Right: Deeply impacted and occluding cerumen removed with curette and suction.  Once removed, EAC patent and small caliber, TM intact without effusion or retraction, middle ear space clear   Left: Deeply impacted and occluding cerumen removed with curette and suction.  Once removed, EAC patent and normal, TM intact without effusion or retraction, middle ear space clear       Last Recorded Vitals  Blood pressure (!) 174/103, height 1.524 m (5'), weight (!) 44 kg (97 lb).    Relevant Results  Audiogram 9/3/24:      My independent interpretation: bilateral, symmetric SNHL, tympanograms WNL      Assessment and Plan  70 y.o. female with bilateral impacted cerumen which was removed under otomicroscopy utilizing instrumentation.  She was previously in for pulsatile tinnitus which has since resolved.  She had a episode of sinusitis which was her  first this year and was successfully treated with a Z-Mike.  She still feels like she has some residual symptoms leading to postnasal drip causing a sore throat.  I do not think that she has a strep infection based on her exam today.  I would like her to continue current medical therapy and add saline irrigations to help treat the postnasal drainage.  She was recently prescribed doxycycline due to some persistent sinus symptoms however since being prescribed this, her symptoms have improved and thus she did not start the medication.  There is no sign of a persistent sinus infection on exam today and thus I think that she can hold off on starting the Doxy.    Of note, she has a history of whitecoat hypertension and her systolic BP was in the 170s today.  She reports that when she takes her BP at home, it is in the 110s systolic.      Problem List Items Addressed This Visit       Allergic rhinitis - Primary    Sore throat     Other Visit Diagnoses         White coat syndrome with diagnosis of hypertension          Bilateral impacted cerumen                -RTC 3-6 months for repeat ear cleaning and follow-up of sinus symptoms    Shaila Atkins MD

## 2025-06-30 ENCOUNTER — APPOINTMENT (OUTPATIENT)
Facility: CLINIC | Age: 70
End: 2025-06-30
Payer: MEDICARE

## 2025-06-30 ENCOUNTER — PATIENT OUTREACH (OUTPATIENT)
Dept: PRIMARY CARE | Facility: CLINIC | Age: 70
End: 2025-06-30

## 2025-06-30 DIAGNOSIS — I10 HYPERTENSION, UNSPECIFIED TYPE: ICD-10-CM

## 2025-06-30 DIAGNOSIS — E11.9 TYPE 2 DIABETES MELLITUS WITHOUT COMPLICATION, WITHOUT LONG-TERM CURRENT USE OF INSULIN: ICD-10-CM

## 2025-06-30 ASSESSMENT — ENCOUNTER SYMPTOMS
FEVER: 0
DYSURIA: 0
BELCHING: 1
HEADACHES: 1
VOMITING: 0
ANOREXIA: 1
WEIGHT LOSS: 0
MYALGIAS: 1
CONSTIPATION: 0
ABDOMINAL PAIN: 1
NAUSEA: 1
HEMATURIA: 0
FREQUENCY: 0
DIARRHEA: 0
ARTHRALGIAS: 0
HEMATOCHEZIA: 0
FLATUS: 1

## 2025-07-01 ENCOUNTER — OFFICE VISIT (OUTPATIENT)
Dept: PRIMARY CARE | Facility: CLINIC | Age: 70
End: 2025-07-01
Payer: MEDICARE

## 2025-07-01 VITALS
OXYGEN SATURATION: 99 % | WEIGHT: 96.4 LBS | SYSTOLIC BLOOD PRESSURE: 118 MMHG | HEART RATE: 74 BPM | TEMPERATURE: 97.2 F | DIASTOLIC BLOOD PRESSURE: 73 MMHG | BODY MASS INDEX: 18.83 KG/M2

## 2025-07-01 DIAGNOSIS — R14.0 ABDOMINAL BLOATING: ICD-10-CM

## 2025-07-01 DIAGNOSIS — R10.13 EPIGASTRIC PAIN: Primary | ICD-10-CM

## 2025-07-01 PROCEDURE — 3078F DIAST BP <80 MM HG: CPT | Performed by: INTERNAL MEDICINE

## 2025-07-01 PROCEDURE — 1159F MED LIST DOCD IN RCRD: CPT | Performed by: INTERNAL MEDICINE

## 2025-07-01 PROCEDURE — 99214 OFFICE O/P EST MOD 30 MIN: CPT | Performed by: INTERNAL MEDICINE

## 2025-07-01 PROCEDURE — G2211 COMPLEX E/M VISIT ADD ON: HCPCS | Performed by: INTERNAL MEDICINE

## 2025-07-01 PROCEDURE — 1036F TOBACCO NON-USER: CPT | Performed by: INTERNAL MEDICINE

## 2025-07-01 PROCEDURE — 3074F SYST BP LT 130 MM HG: CPT | Performed by: INTERNAL MEDICINE

## 2025-07-01 ASSESSMENT — PATIENT HEALTH QUESTIONNAIRE - PHQ9
2. FEELING DOWN, DEPRESSED OR HOPELESS: NOT AT ALL
SUM OF ALL RESPONSES TO PHQ9 QUESTIONS 1 AND 2: 1
1. LITTLE INTEREST OR PLEASURE IN DOING THINGS: SEVERAL DAYS

## 2025-07-01 NOTE — PROGRESS NOTES
Answers submitted by the patient for this visit:  Abdominal Pain Questionnaire (Submitted on 6/30/2025)  Chief Complaint: Abdominal pain  Chronicity: new  Onset: in the past 7 days  Onset quality: sudden  Frequency: daily  Episode duration: 1 Hours  Progression since onset: gradually improving  Pain location: epigastric region  Pain - numeric: 3/10  Pain quality: burning, cramping  Radiates to: epigastric region  anorexia: Yes  arthralgias: No  belching: Yes  constipation: No  diarrhea: No  dysuria: No  fever: No  flatus: Yes  frequency: No  headaches: Yes  hematochezia: No  hematuria: No  melena: No  myalgias: Yes  nausea: Yes  weight loss: No  vomiting: No  Aggravated by: belching  Relieved by: belching, liquids, passing flatus  Subjective   Patient ID: [id number]     Janneth Celis is a 70 y.o. female who presents for Pain (Patient is here for upper abdominal pain and vertigo.).  Current Medications[1]  History of Present Illness  The patient presents for evaluation of epigastric pain,     She experienced upper abdominal pain on 06/26/2025 after consuming a meal that did not taste right. The next day, she developed mild diarrhea, lower abdominal cramping, and symptoms of gastritis. She had one loose bowel movement with a greenish tint, felt nauseous but did not vomit, and experienced gassiness and frequent burping. Her gallbladder is intact. The epigastric discomfort was intermittent, rated 3/10, and was somewhat relieved by a heating pad. She has a small hiatal hernia and is working with a clinical psychologist. Protonix helped significantly, but symptoms worsened after consuming spaghetti and fish with sauce.    On 06/30/2025, she experienced severe vertigo, dizziness, nausea, and imbalance, leading her to rest at home. She has a history of vertigo, previously suspected to be Meniere's disease. COVID-19 and influenza A and B tests were negative.    She recently recovered from a sinus infection treated with Z-Mike.  She was prescribed doxycycline but did not start it due to stomach sensitivity. She has not tried saline irrigation.        Review of system was reviewed all normal except what is noted in HPI   Medical History[2]   Objective   /73 Comment: verbal from patient, she refused BP  Pulse 74   Temp 36.2 °C (97.2 °F) (Temporal)   Wt (!) 43.7 kg (96 lb 6.4 oz)   SpO2 99%   BMI 18.83 kg/m²      Physical Exam  Constitutional:       Appearance: Normal appearance.   HENT:      Head: Normocephalic.   Cardiovascular:      Rate and Rhythm: Normal rate and regular rhythm.   Pulmonary:      Effort: Pulmonary effort is normal.      Breath sounds: Normal breath sounds.   Musculoskeletal:      Cervical back: Normal range of motion.   Neurological:      Mental Status: She is alert.   Psychiatric:         Mood and Affect: Mood normal.         Behavior: Behavior normal.         Thought Content: Thought content normal.         Judgment: Judgment normal.           Assessment/Plan   Assessment & Plan  1. Epigastric pain  - Symptoms: upper abdominal pain, diarrhea, cramping, nausea, gassiness, improved today  - Possible gastritis, exacerbated by certain foods  - Ordered baseline blood work, liver function tests, CBC, and stool antigen test for H. Pylori  - Exam was completely benign no tenderness or rebound no need for imaging    2. Vertigo  - Severe vertigo on 06/30/2025 with dizziness and nausea  - History of vertigo, possibly Ménière's disease  - Advised to monitor symptoms and avoid triggers    3. Sinus infection  - Completed Z-Mike for sinus infection starting 06/04/2025  - Prescribed doxycycline but did not start due to GI sensitivity  - Advised saline irrigation for congestion    4. Hearing loss  - Severe tinnitus and discomfort with hearing aid molds  - ENT cleared ear wax, no infection or fluid  - Referral to audiologist for further evaluation and management       We will contact patient with results once available.  Margarita  MD Alaina   This medical note was created with the assistance of artificial intelligence (AI) for documentation purposes. The content has been reviewed and confirmed by the healthcare provider for accuracy and completeness. Patient consented to the use of audio recording and use of AI during their visit.           [1]   Current Outpatient Medications:     bimatoprost (Lumigan) 0.01 % ophthalmic solution, Administer 1 drop into both eyes once daily at bedtime., Disp: , Rfl:     calcium/D3/zinc/copper/dallas (CITRACAL-D3 MAXIMUM PLUS ORAL), Take 2 tablets by mouth once daily at noon. Take with meals., Disp: , Rfl:     cholecalciferol (Vitamin D3) 25 MCG (1000 UT) capsule, Take 1 capsule (25 mcg) by mouth once daily at noon. Take with meals., Disp: , Rfl:     clonazePAM (KlonoPIN) 0.5 mg tablet, TAKE 1 TABLET BY MOUTH 2-3 TIMES A DAY AS NEEDED FOR EXTREME ANXIETY OR INSOMNIA, Disp: , Rfl:     escitalopram (Lexapro) 5 mg tablet, Take 0.5 tablets (2.5 mg) by mouth early in the morning.., Disp: , Rfl:     fluticasone (Flonase) 50 mcg/actuation nasal spray, Administer 2 sprays into each nostril once daily as needed for allergies or rhinitis., Disp: , Rfl:     levothyroxine (Synthroid, Levoxyl) 25 mcg tablet, Take 1 tablet (25 mcg) by mouth once daily in the morning. Take before meals. SYNTHROID ONLY., Disp: , Rfl:     metFORMIN (Glucophage) 500 mg tablet, TAKE 2 TABLETS BY MOUTH EVERY DAY, Disp: 180 tablet, Rfl: 3    metoprolol succinate XL (Toprol-XL) 25 mg 24 hr tablet, Take 1 tablet (25 mg) by mouth once daily. Do not crush or chew., Disp: 90 tablet, Rfl: 3    pantoprazole (ProtoNix) 20 mg EC tablet, Take 1 tablet (20 mg) by mouth if needed., Disp: , Rfl:     valACYclovir (Valtrex) 1 gram tablet, Take 2 tablets twice a day for 1 day for flare ups, Disp: 20 tablet, Rfl: 3    alendronate (Fosamax) 70 mg tablet, Take 1 tablet (70 mg) by mouth every 7 days. Take in the morning with a full glass of water, on an empty  stomach, and do not take anything else by mouth or lie down for the next 30 min. (Patient not taking: Reported on 6/14/2025), Disp: 12 tablet, Rfl: 3    clonazePAM (KlonoPIN) 1 mg tablet, Take 1.5 tablets (1.5 mg) by mouth once daily at bedtime., Disp: , Rfl:   [2]   Past Medical History:  Diagnosis Date    Allergic Childhood - seasonal grass allergy    Allergic rhinitis     Anxiety 2000    Dental disease Since adulthood    Depression 2024    Diabetes mellitus (Multi) 2018    Disease of thyroid gland 2011    Eczema Childhood    Encounter for gynecological examination (general) (routine) without abnormal findings     Encounter for gynecological examination without abnormal finding    Encounter for screening for cardiovascular disorders     Encounter for screening for cardiovascular disorders    Essential (primary) hypertension 01/07/2013    Reactive hypertension    Fatigue With fibromyalgia    Fibromyalgia, primary 1997    Frozen shoulder     Glaucoma 2021    HL (hearing loss) 2022    Other conditions influencing health status     Nephrolithiasis    Other conditions influencing health status     Nephrolithiasis    Palpitations 12/14/2012    Palpitations    Personal history of diseases of the blood and blood-forming organs and certain disorders involving the immune mechanism     History of anemia    Personal history of gestational diabetes     History of gestational diabetes    Personal history of other diseases of the circulatory system     History of hypertension    Personal history of other diseases of the musculoskeletal system and connective tissue     History of fibromyositis    Personal history of other diseases of the nervous system and sense organs     History of glaucoma    Personal history of other diseases of the nervous system and sense organs     History of tinnitus    Personal history of other endocrine, nutritional and metabolic disease     History of hypothyroidism    Personal history of other  infectious and parasitic diseases 01/22/2013    History of viral gastroenteritis    Personal history of other mental and behavioral disorders     History of anxiety disorder    Sinusitis     Tinnitus 1990    TMJ dysfunction 1990    Unspecified open wound, unspecified foot, initial encounter 07/12/2013    Open wound of foot    Unspecified pre-eclampsia, unspecified trimester ([facility])     Preeclampsia    Visual impairment Nearsighted since childhood

## 2025-07-18 LAB
ALBUMIN SERPL-MCNC: 4.5 G/DL (ref 3.6–5.1)
ALP SERPL-CCNC: 54 U/L (ref 37–153)
ALT SERPL-CCNC: 9 U/L (ref 6–29)
ANION GAP SERPL CALCULATED.4IONS-SCNC: 6 MMOL/L (CALC) (ref 7–17)
AST SERPL-CCNC: 15 U/L (ref 10–35)
BILIRUB SERPL-MCNC: 1.2 MG/DL (ref 0.2–1.2)
BUN SERPL-MCNC: 12 MG/DL (ref 7–25)
CALCIUM SERPL-MCNC: 9.6 MG/DL (ref 8.6–10.4)
CHLORIDE SERPL-SCNC: 99 MMOL/L (ref 98–110)
CO2 SERPL-SCNC: 29 MMOL/L (ref 20–32)
CREAT SERPL-MCNC: 0.7 MG/DL (ref 0.6–1)
CRP SERPL-MCNC: <3 MG/L
EGFRCR SERPLBLD CKD-EPI 2021: 92 ML/MIN/1.73M2
ERYTHROCYTE [DISTWIDTH] IN BLOOD BY AUTOMATED COUNT: 12.9 % (ref 11–15)
GLUCOSE SERPL-MCNC: 109 MG/DL (ref 65–99)
HCT VFR BLD AUTO: 40.1 % (ref 35–45)
HGB BLD-MCNC: 13.1 G/DL (ref 11.7–15.5)
MCH RBC QN AUTO: 30.3 PG (ref 27–33)
MCHC RBC AUTO-ENTMCNC: 32.7 G/DL (ref 32–36)
MCV RBC AUTO: 92.8 FL (ref 80–100)
PLATELET # BLD AUTO: 301 THOUSAND/UL (ref 140–400)
PMV BLD REES-ECKER: 9.7 FL (ref 7.5–12.5)
POTASSIUM SERPL-SCNC: 4 MMOL/L (ref 3.5–5.3)
PROT SERPL-MCNC: 7.5 G/DL (ref 6.1–8.1)
RBC # BLD AUTO: 4.32 MILLION/UL (ref 3.8–5.1)
SODIUM SERPL-SCNC: 134 MMOL/L (ref 135–146)
WBC # BLD AUTO: 4.8 THOUSAND/UL (ref 3.8–10.8)

## 2025-07-22 ENCOUNTER — PATIENT OUTREACH (OUTPATIENT)
Dept: PRIMARY CARE | Facility: CLINIC | Age: 70
End: 2025-07-22
Payer: MEDICARE

## 2025-07-22 DIAGNOSIS — Z79.4 TYPE 2 DIABETES MELLITUS WITHOUT COMPLICATION, WITH LONG-TERM CURRENT USE OF INSULIN: ICD-10-CM

## 2025-07-22 DIAGNOSIS — E11.9 TYPE 2 DIABETES MELLITUS WITHOUT COMPLICATION, WITH LONG-TERM CURRENT USE OF INSULIN: ICD-10-CM

## 2025-07-22 DIAGNOSIS — I10 HYPERTENSION, UNSPECIFIED TYPE: ICD-10-CM

## 2025-07-22 DIAGNOSIS — K81.0 CHOLECYSTITIS, ACUTE: ICD-10-CM

## 2025-07-22 NOTE — PROGRESS NOTES
Discharge facility: [facility]  Discharge diagnosis: acute cholecystitis  Admission date: 7/19/25  Discharge date: 7/21/25    PCP Appointment Date: FRANNIE  Specialist Appointment Date: na  Hospital Encounter and Summary: not available at this time   See Discharge assessment below for further details     Wrap Up  Is the patient/caregiver familiar with Advance Care Planning?: Yes (7/22/2025  3:16 PM)  Would the patient like more information on Advance Care Planning?: No (7/22/2025  3:16 PM)  Wrap Up Additional Comments: Spoke with pt and she is doing well. Having pain mainly when walking but has only been using tylenol at this time. Has been constipated and stated she'll try OTC meds. No questions regarding DC instructions. Wants some nutritional guidance regarding a low fat diet and diabetic diet.. will refer to dietician per pt. Aware to call this nurse with any needs. Plans to keep already scheduled appt with PCP in august. (7/22/2025  3:16 PM)  Call End Time: 0917 (7/22/2025  3:16 PM)    Engagement  Call Start Time: 0907 (7/22/2025  3:16 PM)    Medications  Medications reviewed with patient/caregiver?: Yes (7/22/2025  3:16 PM)  Is the patient having any side effects they believe may be caused by any medication additions or changes?: No (7/22/2025  3:16 PM)  Does the patient have all medications ordered at discharge?: Yes (7/22/2025  3:16 PM)  Care Management Interventions: No intervention needed (7/22/2025  3:16 PM)  Is the patient taking all medications as directed (includes completed medication regime)?: Yes (7/22/2025  3:16 PM)    Appointments  Does the patient have a primary care provider?: Yes (7/22/2025  3:16 PM)  Care Management Interventions: Advised patient to make appointment (7/22/2025  3:16 PM)  Has the patient kept scheduled appointments due by today?: Yes (7/22/2025  3:16 PM)  Care Management Interventions: Educated on importance of keeping appointment (7/22/2025  3:16 PM)    Self  Management  What is the home health agency?: na (7/22/2025  3:16 PM)  Has home health visited the patient within 72 hours of discharge?: Not applicable (7/22/2025  3:16 PM)  What Durable Medical Equipment (DME) was ordered?: na (7/22/2025  3:16 PM)    Patient Teaching  Does the patient have access to their discharge instructions?: Yes (7/22/2025  3:16 PM)  Care Management Interventions: Reviewed instructions with patient (7/22/2025  3:16 PM)  What is the patient's perception of their health status since discharge?: Improving (7/22/2025  3:16 PM)  Is the patient/caregiver able to teach back the hierarchy of who to call/visit for symptoms/problems? PCP, Specialist, Home Health nurse, Urgent Care, ED, 911: Yes (7/22/2025  3:16 PM)

## 2025-07-24 ENCOUNTER — APPOINTMENT (OUTPATIENT)
Dept: OBSTETRICS AND GYNECOLOGY | Facility: CLINIC | Age: 70
End: 2025-07-24
Payer: MEDICARE

## 2025-08-01 ENCOUNTER — OFFICE VISIT (OUTPATIENT)
Dept: PRIMARY CARE | Facility: CLINIC | Age: 70
End: 2025-08-01
Payer: MEDICARE

## 2025-08-01 VITALS
RESPIRATION RATE: 16 BRPM | WEIGHT: 91 LBS | BODY MASS INDEX: 17.87 KG/M2 | TEMPERATURE: 98 F | HEART RATE: 91 BPM | OXYGEN SATURATION: 97 % | HEIGHT: 60 IN

## 2025-08-01 DIAGNOSIS — N39.0 URINARY TRACT INFECTION WITHOUT HEMATURIA, SITE UNSPECIFIED: Primary | ICD-10-CM

## 2025-08-01 LAB
POC APPEARANCE, URINE: CLEAR
POC BILIRUBIN, URINE: NEGATIVE
POC BLOOD, URINE: ABNORMAL
POC COLOR, URINE: ABNORMAL
POC GLUCOSE, URINE: NEGATIVE MG/DL
POC KETONES, URINE: NEGATIVE MG/DL
POC LEUKOCYTES, URINE: NEGATIVE
POC NITRITE,URINE: NEGATIVE
POC PH, URINE: 6 PH
POC PROTEIN, URINE: NEGATIVE MG/DL
POC SPECIFIC GRAVITY, URINE: 1.02
POC UROBILINOGEN, URINE: 0.2 EU/DL

## 2025-08-01 PROCEDURE — 1159F MED LIST DOCD IN RCRD: CPT | Performed by: NURSE PRACTITIONER

## 2025-08-01 PROCEDURE — 3008F BODY MASS INDEX DOCD: CPT | Performed by: NURSE PRACTITIONER

## 2025-08-01 PROCEDURE — 1036F TOBACCO NON-USER: CPT | Performed by: NURSE PRACTITIONER

## 2025-08-01 PROCEDURE — 81003 URINALYSIS AUTO W/O SCOPE: CPT | Performed by: NURSE PRACTITIONER

## 2025-08-01 PROCEDURE — 99213 OFFICE O/P EST LOW 20 MIN: CPT | Performed by: NURSE PRACTITIONER

## 2025-08-01 RX ORDER — NITROFURANTOIN 25; 75 MG/1; MG/1
100 CAPSULE ORAL 2 TIMES DAILY
Qty: 10 CAPSULE | Refills: 0 | Status: SHIPPED | OUTPATIENT
Start: 2025-08-01 | End: 2025-08-06

## 2025-08-01 ASSESSMENT — ENCOUNTER SYMPTOMS
DIARRHEA: 1
NAUSEA: 0
DYSURIA: 1
VOMITING: 0
FLANK PAIN: 0
CONSTIPATION: 0
FREQUENCY: 1
ABDOMINAL PAIN: 0

## 2025-08-01 ASSESSMENT — PATIENT HEALTH QUESTIONNAIRE - PHQ9
SUM OF ALL RESPONSES TO PHQ9 QUESTIONS 1 AND 2: 0
2. FEELING DOWN, DEPRESSED OR HOPELESS: NOT AT ALL
1. LITTLE INTEREST OR PLEASURE IN DOING THINGS: NOT AT ALL

## 2025-08-01 NOTE — PROGRESS NOTES
Subjective   Janneth Celis is a 70 y.o. female who complains of dysuria, frequency, suprapubic pressure, and urgency. She has had symptoms for 2 days. Patient denies back pain, fever, and sorethroat. Patient does not have a history of recurrent UTI. Patient does not have a history of pyelonephritis. Patient recently had gallbladder surgery, and has been struggling with intermittent diarrhea. She is using soaps with scrubbing in her perineal region. Today she took an at home test which resulted with leukocytes.       Review of Systems   Gastrointestinal:  Positive for diarrhea. Negative for abdominal pain, constipation, nausea and vomiting.   Genitourinary:  Positive for dysuria, frequency and urgency. Negative for flank pain and vaginal discharge.   All other systems reviewed and are negative.      Objective   Pulse 91   Temp 36.7 °C (98 °F) (Temporal)   Resp 16   Ht (!) 1.524 m (5')   Wt (!) 41.3 kg (91 lb)   SpO2 97%   BMI 17.77 kg/m²     Physical Exam  Vitals reviewed.   Constitutional:       Appearance: Normal appearance. She is normal weight.     Eyes:      Conjunctiva/sclera: Conjunctivae normal.       Cardiovascular:      Rate and Rhythm: Normal rate and regular rhythm.      Heart sounds: Normal heart sounds.   Pulmonary:      Effort: Pulmonary effort is normal.      Breath sounds: Normal breath sounds.   Abdominal:      General: Abdomen is flat. There is no distension.      Palpations: Abdomen is soft.      Tenderness: There is abdominal tenderness (suprapubic). There is no right CVA tenderness, left CVA tenderness or guarding.     Musculoskeletal:         General: Normal range of motion.      Cervical back: Neck supple.     Skin:     General: Skin is warm and dry.     Neurological:      General: No focal deficit present.      Mental Status: She is alert and oriented to person, place, and time.     Psychiatric:         Mood and Affect: Mood normal.         Assessment/Plan   Assessment & Plan  Urinary  tract infection without hematuria, site unspecified    Orders:    POCT UA Automated manually resulted    Urine Culture    nitrofurantoin, macrocrystal-monohydrate, (Macrobid) 100 mg capsule; Take 1 capsule (100 mg) by mouth 2 times a day for 5 days.    -UA (+) for moderate blood. (Per patient she typically has trace blood and sees nephrology for this.)  -No concerns for hydronephrosis.   -Due to (+) at home test, symptoms, increase blood in urine will treat empirically for UTI. This is likely related to intermittent diarrhea from gallbladder surgery.    -Increase fluids.   -Call or return to office as needed if these symptoms worsen or fail to improve as anticipated.  -Has follow up with Dr. Garduno in 2 weeks.

## 2025-08-03 LAB — BACTERIA UR CULT: NORMAL

## 2025-08-04 ENCOUNTER — PATIENT OUTREACH (OUTPATIENT)
Dept: PRIMARY CARE | Facility: CLINIC | Age: 70
End: 2025-08-04
Payer: MEDICARE

## 2025-08-04 LAB — BACTERIA UR CULT: ABNORMAL

## 2025-08-05 ENCOUNTER — TELEPHONE (OUTPATIENT)
Dept: PRIMARY CARE | Facility: CLINIC | Age: 70
End: 2025-08-05
Payer: MEDICARE

## 2025-08-06 DIAGNOSIS — R00.2 PALPITATIONS: ICD-10-CM

## 2025-08-07 ENCOUNTER — PATIENT OUTREACH (OUTPATIENT)
Dept: PRIMARY CARE | Facility: CLINIC | Age: 70
End: 2025-08-07

## 2025-08-07 ENCOUNTER — PATIENT OUTREACH (OUTPATIENT)
Dept: PRIMARY CARE | Facility: CLINIC | Age: 70
End: 2025-08-07
Payer: MEDICARE

## 2025-08-07 DIAGNOSIS — I10 HYPERTENSION, UNSPECIFIED TYPE: ICD-10-CM

## 2025-08-07 DIAGNOSIS — E11.9 TYPE 2 DIABETES MELLITUS WITHOUT COMPLICATION, WITH LONG-TERM CURRENT USE OF INSULIN: ICD-10-CM

## 2025-08-07 DIAGNOSIS — Z79.4 TYPE 2 DIABETES MELLITUS WITHOUT COMPLICATION, WITH LONG-TERM CURRENT USE OF INSULIN: ICD-10-CM

## 2025-08-08 RX ORDER — METOPROLOL SUCCINATE 25 MG/1
25 TABLET, EXTENDED RELEASE ORAL DAILY
Qty: 90 TABLET | Refills: 3 | Status: SHIPPED | OUTPATIENT
Start: 2025-08-08 | End: 2026-08-08

## 2025-08-19 DIAGNOSIS — Z12.31 ENCOUNTER FOR SCREENING MAMMOGRAM FOR BREAST CANCER: ICD-10-CM

## 2025-08-20 ENCOUNTER — APPOINTMENT (OUTPATIENT)
Dept: PRIMARY CARE | Facility: CLINIC | Age: 70
End: 2025-08-20
Payer: MEDICARE

## 2025-08-20 VITALS
BODY MASS INDEX: 17.58 KG/M2 | HEART RATE: 87 BPM | SYSTOLIC BLOOD PRESSURE: 138 MMHG | OXYGEN SATURATION: 98 % | DIASTOLIC BLOOD PRESSURE: 72 MMHG | WEIGHT: 90 LBS | TEMPERATURE: 97.5 F

## 2025-08-20 DIAGNOSIS — Z12.31 ENCOUNTER FOR SCREENING MAMMOGRAM FOR BREAST CANCER: ICD-10-CM

## 2025-08-20 DIAGNOSIS — I10 BENIGN ESSENTIAL HYPERTENSION: ICD-10-CM

## 2025-08-20 DIAGNOSIS — E04.1 THYROID NODULE: ICD-10-CM

## 2025-08-20 DIAGNOSIS — R63.4 WEIGHT LOSS: ICD-10-CM

## 2025-08-20 DIAGNOSIS — Z00.00 ROUTINE GENERAL MEDICAL EXAMINATION AT HEALTH CARE FACILITY: ICD-10-CM

## 2025-08-20 DIAGNOSIS — R35.0 URINARY FREQUENCY: ICD-10-CM

## 2025-08-20 DIAGNOSIS — Z00.00 MEDICARE ANNUAL WELLNESS VISIT, SUBSEQUENT: Primary | ICD-10-CM

## 2025-08-20 LAB
POC APPEARANCE, URINE: CLEAR
POC BILIRUBIN, URINE: NEGATIVE
POC BLOOD, URINE: ABNORMAL
POC COLOR, URINE: YELLOW
POC GLUCOSE, URINE: NEGATIVE MG/DL
POC KETONES, URINE: NEGATIVE MG/DL
POC LEUKOCYTES, URINE: NEGATIVE
POC NITRITE,URINE: NEGATIVE
POC PH, URINE: 6 PH
POC PROTEIN, URINE: NEGATIVE MG/DL
POC SPECIFIC GRAVITY, URINE: 1.02
POC UROBILINOGEN, URINE: 0.2 EU/DL

## 2025-08-20 PROCEDURE — 1160F RVW MEDS BY RX/DR IN RCRD: CPT | Performed by: INTERNAL MEDICINE

## 2025-08-20 PROCEDURE — 1036F TOBACCO NON-USER: CPT | Performed by: INTERNAL MEDICINE

## 2025-08-20 PROCEDURE — 1158F ADVNC CARE PLAN TLK DOCD: CPT | Performed by: INTERNAL MEDICINE

## 2025-08-20 PROCEDURE — 3078F DIAST BP <80 MM HG: CPT | Performed by: INTERNAL MEDICINE

## 2025-08-20 PROCEDURE — 99215 OFFICE O/P EST HI 40 MIN: CPT | Performed by: INTERNAL MEDICINE

## 2025-08-20 PROCEDURE — 81003 URINALYSIS AUTO W/O SCOPE: CPT | Performed by: INTERNAL MEDICINE

## 2025-08-20 PROCEDURE — 1159F MED LIST DOCD IN RCRD: CPT | Performed by: INTERNAL MEDICINE

## 2025-08-20 PROCEDURE — 1170F FXNL STATUS ASSESSED: CPT | Performed by: INTERNAL MEDICINE

## 2025-08-20 PROCEDURE — 3075F SYST BP GE 130 - 139MM HG: CPT | Performed by: INTERNAL MEDICINE

## 2025-08-20 PROCEDURE — G0439 PPPS, SUBSEQ VISIT: HCPCS | Performed by: INTERNAL MEDICINE

## 2025-08-20 ASSESSMENT — PATIENT HEALTH QUESTIONNAIRE - PHQ9
5. POOR APPETITE OR OVEREATING: SEVERAL DAYS
8. MOVING OR SPEAKING SO SLOWLY THAT OTHER PEOPLE COULD HAVE NOTICED. OR THE OPPOSITE, BEING SO FIGETY OR RESTLESS THAT YOU HAVE BEEN MOVING AROUND A LOT MORE THAN USUAL: NOT AT ALL
1. LITTLE INTEREST OR PLEASURE IN DOING THINGS: SEVERAL DAYS
7. TROUBLE CONCENTRATING ON THINGS, SUCH AS READING THE NEWSPAPER OR WATCHING TELEVISION: SEVERAL DAYS
3. TROUBLE FALLING OR STAYING ASLEEP OR SLEEPING TOO MUCH: SEVERAL DAYS
SUM OF ALL RESPONSES TO PHQ9 QUESTIONS 1 AND 2: 4
9. THOUGHTS THAT YOU WOULD BE BETTER OFF DEAD, OR OF HURTING YOURSELF: NOT AT ALL
4. FEELING TIRED OR HAVING LITTLE ENERGY: SEVERAL DAYS
6. FEELING BAD ABOUT YOURSELF - OR THAT YOU ARE A FAILURE OR HAVE LET YOURSELF OR YOUR FAMILY DOWN: NOT AT ALL
SUM OF ALL RESPONSES TO PHQ QUESTIONS 1-9: 8
2. FEELING DOWN, DEPRESSED OR HOPELESS: NEARLY EVERY DAY

## 2025-08-20 ASSESSMENT — ACTIVITIES OF DAILY LIVING (ADL)
TAKING_MEDICATION: INDEPENDENT
GROCERY_SHOPPING: INDEPENDENT
DOING_HOUSEWORK: INDEPENDENT
BATHING: INDEPENDENT
MANAGING_FINANCES: INDEPENDENT
DRESSING: INDEPENDENT

## 2025-08-22 LAB — BACTERIA UR CULT: NORMAL

## 2025-08-24 DIAGNOSIS — R39.9 UTI SYMPTOMS: Primary | ICD-10-CM

## 2025-08-24 RX ORDER — NITROFURANTOIN 25; 75 MG/1; MG/1
100 CAPSULE ORAL 2 TIMES DAILY
Qty: 14 CAPSULE | Refills: 0 | Status: SHIPPED | OUTPATIENT
Start: 2025-08-24 | End: 2025-08-31

## 2025-09-19 ENCOUNTER — APPOINTMENT (OUTPATIENT)
Dept: CARDIOLOGY | Facility: CLINIC | Age: 70
End: 2025-09-19
Payer: MEDICARE

## 2025-11-11 ENCOUNTER — APPOINTMENT (OUTPATIENT)
Facility: CLINIC | Age: 70
End: 2025-11-11
Payer: MEDICARE

## 2026-08-18 ENCOUNTER — APPOINTMENT (OUTPATIENT)
Dept: PRIMARY CARE | Facility: CLINIC | Age: 71
End: 2026-08-18
Payer: MEDICARE